# Patient Record
Sex: FEMALE | Race: WHITE | NOT HISPANIC OR LATINO
[De-identification: names, ages, dates, MRNs, and addresses within clinical notes are randomized per-mention and may not be internally consistent; named-entity substitution may affect disease eponyms.]

---

## 2018-04-26 ENCOUNTER — APPOINTMENT (OUTPATIENT)
Dept: SURGERY | Facility: CLINIC | Age: 43
End: 2018-04-26
Payer: COMMERCIAL

## 2018-04-26 VITALS
BODY MASS INDEX: 24.11 KG/M2 | OXYGEN SATURATION: 100 % | TEMPERATURE: 98.5 F | DIASTOLIC BLOOD PRESSURE: 83 MMHG | HEART RATE: 65 BPM | WEIGHT: 150 LBS | HEIGHT: 66 IN | SYSTOLIC BLOOD PRESSURE: 127 MMHG | RESPIRATION RATE: 14 BRPM

## 2018-04-26 DIAGNOSIS — Z82.3 FAMILY HISTORY OF STROKE: ICD-10-CM

## 2018-04-26 DIAGNOSIS — N83.209 UNSPECIFIED OVARIAN CYST, UNSPECIFIED SIDE: ICD-10-CM

## 2018-04-26 DIAGNOSIS — N93.9 ABNORMAL UTERINE AND VAGINAL BLEEDING, UNSPECIFIED: ICD-10-CM

## 2018-04-26 DIAGNOSIS — Z86.79 PERSONAL HISTORY OF OTHER DISEASES OF THE CIRCULATORY SYSTEM: ICD-10-CM

## 2018-04-26 DIAGNOSIS — K63.5 POLYP OF COLON: ICD-10-CM

## 2018-04-26 DIAGNOSIS — Z82.49 FAMILY HISTORY OF ISCHEMIC HEART DISEASE AND OTHER DISEASES OF THE CIRCULATORY SYSTEM: ICD-10-CM

## 2018-04-26 DIAGNOSIS — Z87.2 PERSONAL HISTORY OF DISEASES OF THE SKIN AND SUBCUTANEOUS TISSUE: ICD-10-CM

## 2018-04-26 DIAGNOSIS — Z80.0 FAMILY HISTORY OF MALIGNANT NEOPLASM OF DIGESTIVE ORGANS: ICD-10-CM

## 2018-04-26 DIAGNOSIS — Z83.49 FAMILY HISTORY OF OTHER ENDOCRINE, NUTRITIONAL AND METABOLIC DISEASES: ICD-10-CM

## 2018-04-26 PROCEDURE — 99203 OFFICE O/P NEW LOW 30 MIN: CPT

## 2018-05-09 ENCOUNTER — APPOINTMENT (OUTPATIENT)
Dept: SURGERY | Facility: CLINIC | Age: 43
End: 2018-05-09
Payer: COMMERCIAL

## 2018-05-09 VITALS
SYSTOLIC BLOOD PRESSURE: 111 MMHG | OXYGEN SATURATION: 100 % | RESPIRATION RATE: 15 BRPM | DIASTOLIC BLOOD PRESSURE: 76 MMHG | HEART RATE: 58 BPM | TEMPERATURE: 98.2 F

## 2018-05-09 PROCEDURE — 99213 OFFICE O/P EST LOW 20 MIN: CPT

## 2018-05-09 RX ORDER — CEFUROXIME AXETIL 250 MG/1
250 TABLET ORAL TWICE DAILY
Qty: 20 | Refills: 0 | Status: DISCONTINUED | COMMUNITY
Start: 2018-04-26 | End: 2018-05-09

## 2019-09-24 ENCOUNTER — APPOINTMENT (OUTPATIENT)
Dept: ORTHOPEDIC SURGERY | Facility: CLINIC | Age: 44
End: 2019-09-24
Payer: COMMERCIAL

## 2019-09-24 VITALS
HEIGHT: 66 IN | WEIGHT: 170 LBS | BODY MASS INDEX: 27.32 KG/M2 | SYSTOLIC BLOOD PRESSURE: 108 MMHG | HEART RATE: 96 BPM | DIASTOLIC BLOOD PRESSURE: 74 MMHG

## 2019-09-24 DIAGNOSIS — M23.92 UNSPECIFIED INTERNAL DERANGEMENT OF LEFT KNEE: ICD-10-CM

## 2019-09-24 PROCEDURE — 20610 DRAIN/INJ JOINT/BURSA W/O US: CPT | Mod: LT

## 2019-09-24 PROCEDURE — 73564 X-RAY EXAM KNEE 4 OR MORE: CPT | Mod: LT

## 2019-09-24 PROCEDURE — 99204 OFFICE O/P NEW MOD 45 MIN: CPT | Mod: 25

## 2019-09-24 NOTE — HISTORY OF PRESENT ILLNESS
[de-identified] : Ms. AYANA MÉNDEZ is a 44 year female who presents to office complaining of left knee pain with insidious onset over the last few months.\par Pain is mainly present with positional changes such as when lifting the leg and when bending it when off of her leg. It doesn't hurt with walking or going up and down stairs.\par Pain is a dull/achy pain that is becoming more and more constant.\par Denies radiation of pain or distal neuropathy.\par Relieving factors include nothing specifically.\par Denies buckling, locking, numbness, tingling, etc.\par Patient has not tried any conservative treatments for her pain thus far.\par All review of systems, family history, social history, surgical history, and past medical history not previously stated as positive are negative.

## 2019-09-24 NOTE — PHYSICAL EXAM
[de-identified] : Constitutional - the patient is of normal build and nutrition.  The patient remains oriented to person, time, and  place.  Mood is normal. Vital signs as recorded.  The patients gait is WNL. The patient has satisfactory  balance and can stand on toes and heels.\par \par The patient has no difficulty with respiration. Respiration at rest is a normal rate. The patient is not short of breath and has not become short of breath with short ambulation. There is no audible wheezing. No coughing.\par \par Skin is normal for the patient's age. There are no abnormal masses or lymph nodes which stand out in the lower extremities.\par \par Spine - deep tendon reflexes are symmetric.  She has been seen by Dr. Zechariah Jarvis for some scoliosis.\par \par \par UPPER EXTREMITIES \par \par Shoulders ROM  is symmetric  and the motion is satisfactory.  There is no significant shoulder pain or limitation in motion which would make using a cane or a walker difficult. Shoulder stability and  strength are satisfactory.\par \par Circulation appears satisfactory with pedal pulses present.  There is no major edema in the lower legs. No skin tenderness or increased temperature. No major varicosities.\par \par HIP EXAMINATION the abduction and abduction as well as rotation measurements were taken with the hip in flexion.\par \par Motion\par Hip flexion                               *[Right 135   Left 135]\par Hip abduction                         *[Right 70      Left 70]\par Hip adduction                         *[Right 35     Left 35]\par Hip external  Rotation             *[Right 65     Left 65]\par Hip Internal Rotation              *[Right 20      Left 20]\par Hip Extension                         *[Right 0        Left 0]\par \par The hips have good range of motion. There is good strength across the hips. There is no crepitus in either hip. The alignment of the hips are normal.\par \par \par KNEE EXAMINATION\par \par Motion\par Right Knee    has no pain.  She goes from full extension to 140 degrees of flexion.  She has good medial lateral and anterior posterior stability there is no major crepitus.  There is no Baker's cyst and no effusion.\par \par    \par Left  Knee    goes from 0 to 140 degrees of flexion but she does have pain with full flexion at the lateral portion of her patella and possibly the posterior lateral joint line.  There is a very small effusion in her knee she has good medial lateral and anterior posterior stability she has no Baker's cyst her Steinmann test at this time is negative.\par \par \par Ankle and foot examination\par Of the ankle has normal motion.  There is normal ankle stability.  The patient has no major abnormalities of the foot.\par \par \par \par  [de-identified] : 4 views of the patient's left knee show patella tracking satisfactorily there may be a very small for spur forming or small osteophyte off the peripheral lateral portion of her patella.  Her alignment is normal and there are no bony abnormalities no major arthritis can be seen by x-ray.  This does not rule out a meniscal tear.

## 2019-09-24 NOTE — PROCEDURE
[de-identified] : Procedure Note:\par \par Anatomic Location:  Left Knee\par \par Diagnosis:  Arthritis\par \par Procedure:  Injection of 2cc  of Marcaine 0.25% plain and Celestone 1cc, 6mg\par \par Local Spray: Ethyl Chloride.\par \par \par Patient has consented for the procedure.\par \par Injection  through a lateral parapatella approach.\par \par Patient tolerated the procedure well.\par \par Patient instructed to call the office if any reaction, fever, chills, increased erythema or swelling.   [phone].

## 2019-09-24 NOTE — DISCUSSION/SUMMARY
[de-identified] : This patient has slight swelling in her left knee and is having either patellofemoral symptoms and pain with the lateral portion of her patella and lateral femoral condyle or could have a small meniscal tear but I tend to doubt that diagnosis.  She is being sent for an MRI she tolerated a local injection well.

## 2019-09-26 ENCOUNTER — OTHER (OUTPATIENT)
Age: 44
End: 2019-09-26

## 2019-10-20 ENCOUNTER — FORM ENCOUNTER (OUTPATIENT)
Age: 44
End: 2019-10-20

## 2019-10-21 ENCOUNTER — OUTPATIENT (OUTPATIENT)
Dept: OUTPATIENT SERVICES | Facility: HOSPITAL | Age: 44
LOS: 1 days | End: 2019-10-21
Payer: COMMERCIAL

## 2019-10-21 ENCOUNTER — APPOINTMENT (OUTPATIENT)
Dept: MRI IMAGING | Facility: CLINIC | Age: 44
End: 2019-10-21
Payer: COMMERCIAL

## 2019-10-21 DIAGNOSIS — Z00.00 ENCOUNTER FOR GENERAL ADULT MEDICAL EXAMINATION WITHOUT ABNORMAL FINDINGS: ICD-10-CM

## 2019-10-21 PROCEDURE — 73721 MRI JNT OF LWR EXTRE W/O DYE: CPT | Mod: 26,LT

## 2019-10-21 PROCEDURE — 73721 MRI JNT OF LWR EXTRE W/O DYE: CPT

## 2019-10-28 ENCOUNTER — APPOINTMENT (OUTPATIENT)
Dept: ORTHOPEDIC SURGERY | Facility: CLINIC | Age: 44
End: 2019-10-28
Payer: COMMERCIAL

## 2019-10-28 DIAGNOSIS — M25.562 PAIN IN LEFT KNEE: ICD-10-CM

## 2019-10-28 PROCEDURE — 99203 OFFICE O/P NEW LOW 30 MIN: CPT

## 2019-10-30 PROBLEM — M25.562 LEFT KNEE PAIN: Status: ACTIVE | Noted: 2019-10-30

## 2019-10-30 NOTE — HISTORY OF PRESENT ILLNESS
[de-identified] : 44 year old female presents today with left knee pain since summer 2019. No injury reported. She was evaluated by Dr. Castillo, cortisone injection was administered and MRI was obtained. Cortisone injection gave her relief for 5 days. MRI obtained and referred for evaluation. The pain is constant worse with flexion, lifting her leg up, and crossing her legs. She describes the pain as achy. She also previously saw a rheumatologist for her hands and knees. Denies buckling, locking, catching, numbness or tingling. \par \par The patient's past medical history, past surgical history, medications and allergies were reviewed by me today with the patient and documented accordingly. In addition, the patient's family and social history, which were noncontributory to this visit, were reviewed also.

## 2019-10-30 NOTE — DISCUSSION/SUMMARY
[de-identified] : 44 year old female presents with left knee patellofemoral chondromalacia.\par \par Presentation is consistent with early degenerative change and arthrosis to the knee. MRI left knee dated 10/21/19 shows incidental finding of a discoid meniscus that does not appear to be the root cause of her pain. Described her discomfort is likely due to overuse, and age-related "wear and tear". Pain may be related to breakdown of the chondral surfaces, cartilage, or ligaments of the knee. \par \par Recommendation: Conservative care & observation; including rest/activity avoidance until less symptomatic with subsequent gradual return to full low impact activity as tolerated. Patient may also use OTC NSAID's or acetaminophen as tolerated, with application of ice/heat to the area 2-3x daily for 20 minutes after periods of activity. \par \par Begin a trial of PT. Rx given. \par \par Followup as needed if pain persists for further evaluation and treatment.\par

## 2019-10-30 NOTE — PHYSICAL EXAM
[de-identified] : Oriented to time, place, person\par Mood: Normal\par Affect: Normal\par Appearance: Healthy, well appearing, no acute distress.\par Gait: Normal\par Assistive Devices: None\par \par Left knee exam\par \par Skin: Clean, dry, intact\par Inspection: No obvious malalignment, no masses, no swelling, no effusion\par Pulses: 2+ DP/PT pulses\par ROM: 0-135 degrees of flexion. + pain with deep knee flexion/extension.\par Tenderness: No MJLT. No LJLT. No pain over the patella facets. No pain to the quadriceps tendon. No pain to the patella tendon. No posterior knee tenderness. + peripatellar ttp\par Stability: Stable to varus, valgus. Negative lachman testing. Negative anterior drawer, negative posterior drawer.\par Strength: 5/5 Q/H/TA/GS/EHL, without atrophy\par Neuro: In tact to light touch throughout, DTR's normal\par Additional tests: Negative McMurrays test, Negative patellar grind test.  [de-identified] : \par 4 views of the left knee were obtained 9/24/2019 that show no acute fracture or dislocation. There is no medial, no lateral and no patellofemoral degenerative change seen. There is no significant malalignment. No significant other obvious osseous abnormality, otherwise unremarkable.\par \par MRI left knee dated 10/21/19 shows discoid meniscus and early degeneration of medial meniscus without tear.\par

## 2020-01-12 ENCOUNTER — TRANSCRIPTION ENCOUNTER (OUTPATIENT)
Age: 45
End: 2020-01-12

## 2021-02-10 ENCOUNTER — APPOINTMENT (OUTPATIENT)
Dept: SURGERY | Facility: CLINIC | Age: 46
End: 2021-02-10
Payer: COMMERCIAL

## 2021-02-10 VITALS
DIASTOLIC BLOOD PRESSURE: 77 MMHG | RESPIRATION RATE: 15 BRPM | OXYGEN SATURATION: 97 % | HEART RATE: 90 BPM | SYSTOLIC BLOOD PRESSURE: 121 MMHG | TEMPERATURE: 97.7 F

## 2021-02-10 DIAGNOSIS — F41.8 OTHER SPECIFIED ANXIETY DISORDERS: ICD-10-CM

## 2021-02-10 PROCEDURE — 10081 I&D PILONIDAL CYST COMP: CPT

## 2021-02-10 PROCEDURE — 99072 ADDL SUPL MATRL&STAF TM PHE: CPT

## 2021-02-10 PROCEDURE — 99214 OFFICE O/P EST MOD 30 MIN: CPT | Mod: 25

## 2021-02-10 RX ORDER — CLONAZEPAM 0.5 MG/1
0.5 TABLET ORAL
Refills: 0 | Status: ACTIVE | COMMUNITY

## 2021-02-10 RX ORDER — ALPRAZOLAM 2 MG/1
TABLET ORAL
Refills: 0 | Status: ACTIVE | COMMUNITY

## 2021-02-10 RX ORDER — ESCITALOPRAM OXALATE 20 MG/1
20 TABLET, FILM COATED ORAL
Refills: 0 | Status: ACTIVE | COMMUNITY

## 2021-02-10 NOTE — HISTORY OF PRESENT ILLNESS
[FreeTextEntry1] : Chayito is a 46 y/o female here for follow up visit. Patient had surgery for pilonidal disease at age 19. She was seen on 4/26/18 with pilonidal cyst enlargement. No erythema or tenderness. Started on Cefuroxime. Last seen on 5/9/18, patient asymptomatic. No surgery indicated at that time. \par Today reports pain started a week ago and started to drain on Saturday. Reports slight constipation but has daily bm - no pain or bleeding.

## 2021-02-10 NOTE — ASSESSMENT
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  I recommended incision and drainage.  Patient agreed.  1% lidocaine with half percent Marcaine plus epinephrine injected.  Incision and drainage performed with excision of 1 cm diameter [place] of skin and disruption of loculations.  Sitz bath's.  Cefuroxime because of surrounding cellulitis.  Follow-up 3 weeks.  Since patient has had recurrence with a complex abscess, elective excision should be considered.  This was discussed with the patient and all questions were answered

## 2021-02-17 LAB — BACTERIA WND CULT: ABNORMAL

## 2021-03-03 ENCOUNTER — APPOINTMENT (OUTPATIENT)
Dept: SURGERY | Facility: CLINIC | Age: 46
End: 2021-03-03
Payer: COMMERCIAL

## 2021-03-03 VITALS
BODY MASS INDEX: 30.53 KG/M2 | SYSTOLIC BLOOD PRESSURE: 119 MMHG | HEART RATE: 71 BPM | HEIGHT: 66 IN | WEIGHT: 190 LBS | DIASTOLIC BLOOD PRESSURE: 82 MMHG | OXYGEN SATURATION: 96 % | TEMPERATURE: 97 F | RESPIRATION RATE: 16 BRPM

## 2021-03-03 DIAGNOSIS — L05.01 PILONIDAL CYST WITH ABSCESS: ICD-10-CM

## 2021-03-03 PROCEDURE — 99213 OFFICE O/P EST LOW 20 MIN: CPT

## 2021-03-03 PROCEDURE — 99072 ADDL SUPL MATRL&STAF TM PHE: CPT

## 2021-03-03 RX ORDER — CEFUROXIME AXETIL 500 MG/1
500 TABLET ORAL
Qty: 10 | Refills: 0 | Status: DISCONTINUED | COMMUNITY
Start: 2021-02-10 | End: 2021-03-03

## 2021-03-03 NOTE — HISTORY OF PRESENT ILLNESS
[FreeTextEntry1] : Chayito is a 44 y/o female here for follow up visit. Patient had surgery for pilonidal disease at age 19. She was seen on 4/26/18 with pilonidal cyst enlargement. No erythema or tenderness. Started on Cefuroxime. Last seen on 2/10/21 with recurrent pilonidal abscess after prior surgery. Incision and drainage was performed. Patient was placed on Cefuroxime because of surrounding cellulitis. Since patient has had recurrence with a complex abscess, elective excision should be considered. \par The patient had some drainage for about 2 weeks since I&D which has now subsided. Denies pain. Just some discomfort when applying direct pressure. Abx completed. Denies swelling.

## 2021-03-03 NOTE — ASSESSMENT
[FreeTextEntry1] : Recurrent pilonidal abscess.  Indications, risk, benefits, alternatives for excision with Limberg flap including but not limited to bleeding, infection, recurrence, and prolonged wound healing reviewed.  All questions answered.

## 2021-03-24 ENCOUNTER — OUTPATIENT (OUTPATIENT)
Dept: OUTPATIENT SERVICES | Facility: HOSPITAL | Age: 46
LOS: 1 days | End: 2021-03-24
Payer: COMMERCIAL

## 2021-03-24 VITALS
TEMPERATURE: 98 F | SYSTOLIC BLOOD PRESSURE: 115 MMHG | RESPIRATION RATE: 14 BRPM | DIASTOLIC BLOOD PRESSURE: 83 MMHG | HEIGHT: 66 IN | OXYGEN SATURATION: 97 % | WEIGHT: 192.02 LBS | HEART RATE: 70 BPM

## 2021-03-24 DIAGNOSIS — Z98.890 OTHER SPECIFIED POSTPROCEDURAL STATES: Chronic | ICD-10-CM

## 2021-03-24 DIAGNOSIS — Z01.818 ENCOUNTER FOR OTHER PREPROCEDURAL EXAMINATION: ICD-10-CM

## 2021-03-24 DIAGNOSIS — L05.01 PILONIDAL CYST WITH ABSCESS: ICD-10-CM

## 2021-03-24 DIAGNOSIS — Z97.5 PRESENCE OF (INTRAUTERINE) CONTRACEPTIVE DEVICE: Chronic | ICD-10-CM

## 2021-03-24 DIAGNOSIS — N83.209 UNSPECIFIED OVARIAN CYST, UNSPECIFIED SIDE: Chronic | ICD-10-CM

## 2021-03-24 LAB
ANION GAP SERPL CALC-SCNC: 12 MMOL/L — SIGNIFICANT CHANGE UP (ref 5–17)
BUN SERPL-MCNC: 13 MG/DL — SIGNIFICANT CHANGE UP (ref 7–23)
CALCIUM SERPL-MCNC: 9.6 MG/DL — SIGNIFICANT CHANGE UP (ref 8.4–10.5)
CHLORIDE SERPL-SCNC: 103 MMOL/L — SIGNIFICANT CHANGE UP (ref 96–108)
CO2 SERPL-SCNC: 23 MMOL/L — SIGNIFICANT CHANGE UP (ref 22–31)
CREAT SERPL-MCNC: 0.7 MG/DL — SIGNIFICANT CHANGE UP (ref 0.5–1.3)
GLUCOSE SERPL-MCNC: 86 MG/DL — SIGNIFICANT CHANGE UP (ref 70–99)
HCT VFR BLD CALC: 42.9 % — SIGNIFICANT CHANGE UP (ref 34.5–45)
HGB BLD-MCNC: 14.2 G/DL — SIGNIFICANT CHANGE UP (ref 11.5–15.5)
MCHC RBC-ENTMCNC: 29.6 PG — SIGNIFICANT CHANGE UP (ref 27–34)
MCHC RBC-ENTMCNC: 33.1 GM/DL — SIGNIFICANT CHANGE UP (ref 32–36)
MCV RBC AUTO: 89.4 FL — SIGNIFICANT CHANGE UP (ref 80–100)
NRBC # BLD: 0 /100 WBCS — SIGNIFICANT CHANGE UP (ref 0–0)
PLATELET # BLD AUTO: 307 K/UL — SIGNIFICANT CHANGE UP (ref 150–400)
POTASSIUM SERPL-MCNC: 4.5 MMOL/L — SIGNIFICANT CHANGE UP (ref 3.5–5.3)
POTASSIUM SERPL-SCNC: 4.5 MMOL/L — SIGNIFICANT CHANGE UP (ref 3.5–5.3)
RBC # BLD: 4.8 M/UL — SIGNIFICANT CHANGE UP (ref 3.8–5.2)
RBC # FLD: 12.3 % — SIGNIFICANT CHANGE UP (ref 10.3–14.5)
SODIUM SERPL-SCNC: 138 MMOL/L — SIGNIFICANT CHANGE UP (ref 135–145)
WBC # BLD: 6.49 K/UL — SIGNIFICANT CHANGE UP (ref 3.8–10.5)
WBC # FLD AUTO: 6.49 K/UL — SIGNIFICANT CHANGE UP (ref 3.8–10.5)

## 2021-03-24 PROCEDURE — 85027 COMPLETE CBC AUTOMATED: CPT

## 2021-03-24 PROCEDURE — 80048 BASIC METABOLIC PNL TOTAL CA: CPT

## 2021-03-24 PROCEDURE — G0463: CPT

## 2021-03-24 RX ORDER — SODIUM CHLORIDE 9 MG/ML
3 INJECTION INTRAMUSCULAR; INTRAVENOUS; SUBCUTANEOUS EVERY 8 HOURS
Refills: 0 | Status: DISCONTINUED | OUTPATIENT
Start: 2021-04-05 | End: 2021-04-20

## 2021-03-24 RX ORDER — LIDOCAINE HCL 20 MG/ML
0.2 VIAL (ML) INJECTION ONCE
Refills: 0 | Status: DISCONTINUED | OUTPATIENT
Start: 2021-04-05 | End: 2021-04-20

## 2021-03-24 NOTE — H&P PST ADULT - HEALTH CARE MAINTENANCE
has not received flu vaccine this season has received flu vaccine this season  received 1 dose Pfizer, will received 2nd dose on 3/26

## 2021-03-24 NOTE — H&P PST ADULT - ACTIVITY
housework, shopping, able to walk up 2 flights of stairs take walks, housework, shopping, able to walk up 2 flights of stairs take walks, housework, shopping, able to walk up 2-3 flights of stairs

## 2021-03-24 NOTE — H&P PST ADULT - HISTORY OF PRESENT ILLNESS
44 y/o female here for follow up visit. Patient had surgery for pilonidal disease at age 19. She was seen on 4/26/18 with pilonidal cyst enlargement. No erythema or tenderness. Started on Cefuroxime. Last seen on 2/10/21 with recurrent pilonidal abscess after prior surgery. Incision and drainage was performed. Patient was placed on Cefuroxime because of surrounding cellulitis. Since patient has had recurrence with a complex abscess, elective excision should be considered.   The patient had some drainage for about 2 weeks since I&D which has now subsided. Denies pain. Just some discomfort when applying direct pressure. Abx completed. Denies swelling.      46 y/o female. [facility] anxiety here for follow up visit. Patient had surgery for pilonidal disease at age 19. She was seen on 4/26/18 with pilonidal cyst enlargement. No erythema or tenderness. Started on Cefuroxime. Last seen on 2/10/21 with recurrent pilonidal abscess after prior surgery. Incision and drainage was performed. Patient was placed on Cefuroxime because of surrounding cellulitis. Since patient has had recurrence with a complex abscess, elective excision should be considered.   The patient had some drainage for about 2 weeks since I&D which has now subsided. Denies pain. Just some discomfort when applying direct pressure. Abx completed. Denies swelling.      46 y/o female. PMH anxiety, pilonidal cyst (s/p surgical excision at age 19), recently had recurrence of pilonidal cyst with surrounding cellulitis, treated wth cefuroxime, s/p I&D.  evaluated by Dr. Cox, now presents to [facility] scheduled for excision of pilonidal cyst with Limberg flap closure on 4/6.  pt denies cough, fever, SOB, recent travel or exposure to confirmed covid cases. covid test scheduled on 4/4 at [facility].  **PMH pericarditis in 2003, evaluated by pulmonologist and possibly cardiologist, no surgical intervention required, pericarditis eventually resolved by itself, pt METS=8, denies SOB, palpitations, chest pain, d/w anesthesiologist, Dr. Hollins, no intervention required. see Teams chat 3/24/3021 around 12noon.** 44 y/o female. PMH anxiety, pilonidal cyst (s/p surgical excision at age 19), recently had recurrence of pilonidal cyst with surrounding cellulitis, treated wth cefuroxime, s/p I&D.  evaluated by Dr. Cox, now presents to [facility] scheduled for excision of pilonidal cyst with Limberg flap closure on 4/6. Recurrent abscesses  pt denies cough, fever, SOB, recent travel or exposure to confirmed covid cases. covid test scheduled on 4/4 at [facility].  **PMH pericarditis in 2003, evaluated by pulmonologist and possibly cardiologist, no surgical intervention required, pericarditis eventually resolved by itself, pt METS=8, denies SOB, palpitations, chest pain, d/w anesthesiologist, Dr. Hollins, no intervention required. see Teams chat 3/24/3021 around 12noon.**

## 2021-03-24 NOTE — H&P PST ADULT - NSANTHOSAYNRD_GEN_A_CORE
No. GAUTAM screening performed.  STOP BANG Legend: 0-2 = LOW Risk; 3-4 = INTERMEDIATE Risk; 5-8 = HIGH Risk

## 2021-03-24 NOTE — H&P PST ADULT - NSICDXPASTMEDICALHX_GEN_ALL_CORE_FT
PAST MEDICAL HISTORY:  Anxiety     History of excision of pilonidal cyst during teenage years     PAST MEDICAL HISTORY:  Anxiety     History of excision of pilonidal cyst age 19    Ruptured ovarian cyst PMH, resolved     PAST MEDICAL HISTORY:  Anxiety     H/O pericarditis 2003  evaluated by pulmonologist at the time, no treatment required, eventually resolved without intervention    History of excision of pilonidal cyst age 19    Ruptured ovarian cyst PMH, resolved

## 2021-03-24 NOTE — H&P PST ADULT - LAST ECHOCARDIOGRAM
unsure, "maybe I had one when had pericarditis in 2003, I was evaluated by a pulmonologist and maybe a cardiologist.  I never had any intervention, never tapped.  eventually the carditis resolved without intervention."

## 2021-03-24 NOTE — H&P PST ADULT - NSICDXPROBLEM_GEN_ALL_CORE_FT
PROBLEM DIAGNOSES  Problem: Pilonidal cyst with abscess  Assessment and Plan: excision of pilonidal cyst with Limberg flap closure

## 2021-03-30 PROBLEM — Z98.890 OTHER SPECIFIED POSTPROCEDURAL STATES: Chronic | Status: ACTIVE | Noted: 2021-03-24

## 2021-03-30 PROBLEM — N83.209 UNSPECIFIED OVARIAN CYST, UNSPECIFIED SIDE: Chronic | Status: ACTIVE | Noted: 2021-03-24

## 2021-03-30 PROBLEM — Z86.79 PERSONAL HISTORY OF OTHER DISEASES OF THE CIRCULATORY SYSTEM: Chronic | Status: ACTIVE | Noted: 2021-03-24

## 2021-03-30 PROBLEM — F41.9 ANXIETY DISORDER, UNSPECIFIED: Chronic | Status: ACTIVE | Noted: 2021-03-24

## 2021-04-04 ENCOUNTER — OUTPATIENT (OUTPATIENT)
Dept: OUTPATIENT SERVICES | Facility: HOSPITAL | Age: 46
LOS: 1 days | End: 2021-04-04
Payer: COMMERCIAL

## 2021-04-04 ENCOUNTER — TRANSCRIPTION ENCOUNTER (OUTPATIENT)
Age: 46
End: 2021-04-04

## 2021-04-04 DIAGNOSIS — Z97.5 PRESENCE OF (INTRAUTERINE) CONTRACEPTIVE DEVICE: Chronic | ICD-10-CM

## 2021-04-04 DIAGNOSIS — Z98.890 OTHER SPECIFIED POSTPROCEDURAL STATES: Chronic | ICD-10-CM

## 2021-04-04 DIAGNOSIS — N83.209 UNSPECIFIED OVARIAN CYST, UNSPECIFIED SIDE: Chronic | ICD-10-CM

## 2021-04-04 DIAGNOSIS — Z11.52 ENCOUNTER FOR SCREENING FOR COVID-19: ICD-10-CM

## 2021-04-04 LAB — SARS-COV-2 RNA SPEC QL NAA+PROBE: SIGNIFICANT CHANGE UP

## 2021-04-04 PROCEDURE — C9803: CPT

## 2021-04-04 PROCEDURE — U0005: CPT

## 2021-04-04 PROCEDURE — U0003: CPT

## 2021-04-05 ENCOUNTER — RESULT REVIEW (OUTPATIENT)
Age: 46
End: 2021-04-05

## 2021-04-05 ENCOUNTER — OUTPATIENT (OUTPATIENT)
Dept: OUTPATIENT SERVICES | Facility: HOSPITAL | Age: 46
LOS: 1 days | End: 2021-04-05
Payer: COMMERCIAL

## 2021-04-05 ENCOUNTER — APPOINTMENT (OUTPATIENT)
Dept: SURGERY | Facility: HOSPITAL | Age: 46
End: 2021-04-05
Payer: COMMERCIAL

## 2021-04-05 VITALS
RESPIRATION RATE: 15 BRPM | SYSTOLIC BLOOD PRESSURE: 119 MMHG | TEMPERATURE: 97 F | HEART RATE: 74 BPM | OXYGEN SATURATION: 96 % | HEIGHT: 66 IN | WEIGHT: 192.02 LBS | DIASTOLIC BLOOD PRESSURE: 81 MMHG

## 2021-04-05 VITALS
HEART RATE: 57 BPM | OXYGEN SATURATION: 98 % | DIASTOLIC BLOOD PRESSURE: 66 MMHG | RESPIRATION RATE: 16 BRPM | TEMPERATURE: 97 F | SYSTOLIC BLOOD PRESSURE: 121 MMHG

## 2021-04-05 DIAGNOSIS — L05.01 PILONIDAL CYST WITH ABSCESS: ICD-10-CM

## 2021-04-05 DIAGNOSIS — Z98.890 OTHER SPECIFIED POSTPROCEDURAL STATES: Chronic | ICD-10-CM

## 2021-04-05 DIAGNOSIS — Z97.5 PRESENCE OF (INTRAUTERINE) CONTRACEPTIVE DEVICE: Chronic | ICD-10-CM

## 2021-04-05 DIAGNOSIS — N83.209 UNSPECIFIED OVARIAN CYST, UNSPECIFIED SIDE: Chronic | ICD-10-CM

## 2021-04-05 PROCEDURE — 88304 TISSUE EXAM BY PATHOLOGIST: CPT

## 2021-04-05 PROCEDURE — 15734 MUSCLE-SKIN GRAFT TRUNK: CPT

## 2021-04-05 PROCEDURE — 88304 TISSUE EXAM BY PATHOLOGIST: CPT | Mod: 26

## 2021-04-05 PROCEDURE — 11772 EXC PILONIDAL CYST COMP: CPT

## 2021-04-05 RX ORDER — ACETAMINOPHEN 500 MG
3 TABLET ORAL
Qty: 0 | Refills: 0 | DISCHARGE

## 2021-04-05 RX ORDER — OXYCODONE HYDROCHLORIDE 5 MG/1
1 TABLET ORAL
Qty: 10 | Refills: 0
Start: 2021-04-05 | End: 2021-04-05

## 2021-04-05 RX ORDER — CLONAZEPAM 1 MG
1 TABLET ORAL
Qty: 0 | Refills: 0 | DISCHARGE

## 2021-04-05 RX ORDER — OXYCODONE HYDROCHLORIDE 5 MG/1
5 TABLET ORAL ONCE
Refills: 0 | Status: DISCONTINUED | OUTPATIENT
Start: 2021-04-05 | End: 2021-04-05

## 2021-04-05 RX ORDER — IBUPROFEN 200 MG
3 TABLET ORAL
Qty: 0 | Refills: 0 | DISCHARGE

## 2021-04-05 RX ORDER — ALPRAZOLAM 0.25 MG
1 TABLET ORAL
Qty: 0 | Refills: 0 | DISCHARGE

## 2021-04-05 RX ORDER — ONDANSETRON 8 MG/1
4 TABLET, FILM COATED ORAL ONCE
Refills: 0 | Status: DISCONTINUED | OUTPATIENT
Start: 2021-04-05 | End: 2021-04-20

## 2021-04-05 RX ORDER — ESCITALOPRAM OXALATE 10 MG/1
1 TABLET, FILM COATED ORAL
Qty: 0 | Refills: 0 | DISCHARGE

## 2021-04-05 RX ORDER — SODIUM CHLORIDE 9 MG/ML
1000 INJECTION, SOLUTION INTRAVENOUS
Refills: 0 | Status: DISCONTINUED | OUTPATIENT
Start: 2021-04-05 | End: 2021-04-20

## 2021-04-05 NOTE — ASU DISCHARGE PLAN (ADULT/PEDIATRIC) - NURSING INSTRUCTIONS
You were given Tylenol during your visit, the next dose of Tylenol will be on or after ______9:15 PM_____ ,today/tonight and every 6 hours afterwards for pain management, do not take any Tylenol containing products until this time. Do not exceed more than 4000mg of Tylenol in one 24 hour setting. If no contraindications, you may alternate with Ibuprofen or Naproxen 3 hours after dose of Tylenol. Ibuprofen can be taken every 6 hours. Naproxen may be taken every 12 hours.

## 2021-04-05 NOTE — ASU DISCHARGE PLAN (ADULT/PEDIATRIC) - CARE PROVIDER_API CALL
Rachid Cox)  ColonRectal Surgery; Surgery  [address], [address]  [place] [address]  Phone: [phone]  Fax: [phone]  Follow Up Time:

## 2021-04-05 NOTE — BRIEF OPERATIVE NOTE - NSICDXBRIEFPROCEDURE_GEN_ALL_CORE_FT
PROCEDURES:  Excision, pilonidal cyst, with closure using Z-plasty technique 05-Apr-2021 16:58:32  Avinash Escobar

## 2021-04-05 NOTE — ASU PATIENT PROFILE, ADULT - PSH
History of excision of pilonidal cyst  age 19  Presence of IUD  inserted secondary to menorrhagia  Ruptured ovarian cyst    S/P D&C (status post dilation and curettage)  secondary to menorrhagia x2  2005  2011  S/P LASIK surgery

## 2021-04-05 NOTE — ASU PATIENT PROFILE, ADULT - PMH
Anxiety    H/O pericarditis  2003  evaluated by pulmonologist at the time, no treatment required, eventually resolved without intervention  History of excision of pilonidal cyst  age 19  Ruptured ovarian cyst  PMH, resolved

## 2021-04-05 NOTE — ASU DISCHARGE PLAN (ADULT/PEDIATRIC) - ASU DC SPECIAL INSTRUCTIONSFT
DIET: You may resume your regular diet.  BATHING: Front shower only for the next 3 days.   ACTIVITY: No heavy lifting or straining for 2 weeks. Otherwise, you may return to your usual level of physical activity. If you are taking narcotic pain medication (such as Oxycodone) DO NOT drive or make important decisions.  NOTIFY YOUR SURGEON IF: You have any bleeding that does not stop, any pus draining from your wound(s), any fever (over 100.4) or chills, persistent nausea/vomiting, persistent diarrhea, or if your pain is not controlled on your discharge pain medications.  WOUND CARE: Keep outer clear dressing and gauze in place.  Please keep incisions clean and dry. Please do not scrub or rub incisions. Do not use lotion or powder on incisions.  DRAIN: You will be discharged with a drain present. Please empty and record your drain outputs daily and keep track of amounts in a log. Please bring your log with you to your follow-up appointments. They will aid in deciding when to remove the drains.

## 2021-04-05 NOTE — ASU DISCHARGE PLAN (ADULT/PEDIATRIC) - ACTIVITY LEVEL
Until cleared by Dr. Cox/No excercise/No heavy lifting/No sports/gym/No weight bearing/Nothing per rectum/No tub baths

## 2021-04-08 ENCOUNTER — APPOINTMENT (OUTPATIENT)
Dept: SURGERY | Facility: CLINIC | Age: 46
End: 2021-04-08
Payer: COMMERCIAL

## 2021-04-08 VITALS — DIASTOLIC BLOOD PRESSURE: 74 MMHG | HEART RATE: 74 BPM | SYSTOLIC BLOOD PRESSURE: 109 MMHG | OXYGEN SATURATION: 98 %

## 2021-04-08 VITALS — RESPIRATION RATE: 17 BRPM | TEMPERATURE: 96.7 F

## 2021-04-08 LAB — SURGICAL PATHOLOGY STUDY: SIGNIFICANT CHANGE UP

## 2021-04-08 PROCEDURE — 99024 POSTOP FOLLOW-UP VISIT: CPT

## 2021-04-08 RX ORDER — ESCITALOPRAM OXALATE 5 MG/1
5 TABLET ORAL
Qty: 30 | Refills: 0 | Status: DISCONTINUED | COMMUNITY
Start: 2020-11-06 | End: 2021-04-08

## 2021-04-08 RX ORDER — ESCITALOPRAM OXALATE 10 MG/1
10 TABLET ORAL
Qty: 30 | Refills: 0 | Status: DISCONTINUED | COMMUNITY
Start: 2020-12-11 | End: 2021-04-08

## 2021-04-08 RX ORDER — ALPRAZOLAM 0.25 MG/1
0.25 TABLET ORAL
Qty: 90 | Refills: 0 | Status: DISCONTINUED | COMMUNITY
Start: 2021-01-08 | End: 2021-04-08

## 2021-04-08 NOTE — PHYSICAL EXAM
[de-identified] : non-tender reducible umbilical hernia [FreeTextEntry1] : Normal wound healing.  Steri-Strips in place.  Drain removed.

## 2021-04-08 NOTE — HISTORY OF PRESENT ILLNESS
[FreeTextEntry1] : The patient is a 45 year old female s/p Pilonidal Cystectomy with Limberg Flap on 4/5/21. Patient presents to the office today for drain removal. Patient with persistent pain. Taking Advil/Tylenol OTC. Not taking narcotic. Output 10 cc/daily x 2 days. Patient with persistent nausea. Denies vomiting. Tolerating PO. Has not had a BM since surgery. She is passing gas, feels bloated. Abdominal cramping began yesterday. Pt with known umbilical hernia, was asymptomatic in the past. Now sharp intermittent pain at umbilicus.

## 2021-04-08 NOTE — ASSESSMENT
[FreeTextEntry1] : Normal wound healing.  Dressing changed.  Shower starting tomorrow.  Follow-up 3 weeks

## 2021-04-28 ENCOUNTER — APPOINTMENT (OUTPATIENT)
Dept: SURGERY | Facility: CLINIC | Age: 46
End: 2021-04-28
Payer: COMMERCIAL

## 2021-04-28 VITALS
RESPIRATION RATE: 16 BRPM | OXYGEN SATURATION: 97 % | HEART RATE: 79 BPM | SYSTOLIC BLOOD PRESSURE: 105 MMHG | DIASTOLIC BLOOD PRESSURE: 75 MMHG | TEMPERATURE: 97.3 F

## 2021-04-28 PROCEDURE — 99024 POSTOP FOLLOW-UP VISIT: CPT

## 2021-04-28 NOTE — HISTORY OF PRESENT ILLNESS
[FreeTextEntry1] : Chayito is a 46 y/o female s/p Pilonidal Cystectomy with Limberg Flap on 4/5/21. She was last seen on 4/8/21- Steri-Strips in place. Drain removed. \par Today reports still has pain in area, no drainage noted. Has one normal formed bm daily - no pain or bleeding.\par

## 2021-05-27 ENCOUNTER — APPOINTMENT (OUTPATIENT)
Dept: SURGERY | Facility: CLINIC | Age: 46
End: 2021-05-27
Payer: COMMERCIAL

## 2021-05-27 VITALS
OXYGEN SATURATION: 97 % | TEMPERATURE: 97.8 F | DIASTOLIC BLOOD PRESSURE: 72 MMHG | SYSTOLIC BLOOD PRESSURE: 102 MMHG | HEART RATE: 75 BPM | RESPIRATION RATE: 15 BRPM

## 2021-05-27 DIAGNOSIS — Z09 ENCOUNTER FOR FOLLOW-UP EXAMINATION AFTER COMPLETED TREATMENT FOR CONDITIONS OTHER THAN MALIGNANT NEOPLASM: ICD-10-CM

## 2021-05-27 PROCEDURE — 99024 POSTOP FOLLOW-UP VISIT: CPT

## 2021-05-27 NOTE — ASSESSMENT
[FreeTextEntry1] : Patient with gluteal muscle soreness which is slowly improving.  No evidence of wound infection.  Wound healing normally.  Expect continued slow improvement.  Follow-up 2 months.

## 2021-05-27 NOTE — HISTORY OF PRESENT ILLNESS
[FreeTextEntry1] : Chayito is a 44 y/o female s/p Pilonidal Cystectomy with Limberg Flap on 4/5/21. She was last seen on 4/28, healing well.\par Today reports still uncomfortable with bending or sitting. Moving bowels daily,no pain or bleeding.\par

## 2021-07-28 ENCOUNTER — APPOINTMENT (OUTPATIENT)
Dept: SURGERY | Facility: CLINIC | Age: 46
End: 2021-07-28
Payer: COMMERCIAL

## 2021-08-05 ENCOUNTER — APPOINTMENT (OUTPATIENT)
Dept: SURGERY | Facility: CLINIC | Age: 46
End: 2021-08-05
Payer: COMMERCIAL

## 2021-08-05 VITALS
HEART RATE: 90 BPM | DIASTOLIC BLOOD PRESSURE: 84 MMHG | RESPIRATION RATE: 16 BRPM | SYSTOLIC BLOOD PRESSURE: 120 MMHG | OXYGEN SATURATION: 97 % | TEMPERATURE: 97.6 F

## 2021-08-05 VITALS — RESPIRATION RATE: 16 BRPM | OXYGEN SATURATION: 97 % | HEART RATE: 90 BPM | TEMPERATURE: 97.6 F

## 2021-08-05 DIAGNOSIS — L05.91 PILONIDAL CYST W/OUT ABSCESS: ICD-10-CM

## 2021-08-05 PROCEDURE — 99213 OFFICE O/P EST LOW 20 MIN: CPT

## 2021-08-05 RX ORDER — METFORMIN HYDROCHLORIDE 625 MG/1
TABLET ORAL
Refills: 0 | Status: ACTIVE | COMMUNITY

## 2021-08-05 RX ORDER — BUPROPION HYDROCHLORIDE 100 MG/1
TABLET, FILM COATED ORAL
Refills: 0 | Status: ACTIVE | COMMUNITY

## 2021-08-05 NOTE — HISTORY OF PRESENT ILLNESS
[FreeTextEntry1] : Chayito is a 4 year old female here for 2 month follow up. \par \par s/p Pilonidal Cystectomy with Limberg Flap on 4/5/21\par \par Last colonoscopy 11/5/20-  normal mucosa in the rectum, sigmoid colon, descending colon, transverse colon, ascending colon and cecum. \par Last seen 5/27/21- Patient with gluteal muscle soreness which is slowly improving. No evidence of wound infection. Wound healing normally. Expect continued slow improvement. Follow-up 2 months.\par \par Today pt reports feeling well, no pain. Pt reports occasional sensitivity at surgical site. Pt reports formed BMs daily/ every other day, no pain, no rectal bleeding. Pt reports small appetite, occasional nausea, no vomiting. Not on anticoagulants. \par

## 2021-08-05 NOTE — ASSESSMENT
[FreeTextEntry1] : Pain at harvest site resolved.  Wound healed.  Mild paresthesias.  These should continue to improve.  Patient will return as needed

## 2023-05-08 NOTE — ASU PATIENT PROFILE, ADULT - TEACHING/LEARNING DEVELOPMENTAL CONSIDERATIONS
[facility] Instructions  ([phone])     -Daily weights first thing in the morning after morning void and prior to any oral intake     -Call the clinic with any weight gain greater than 3 pounds over night or 5 pounds in 1 week     -Follow 2000 mg sodium restriction, look at labels and pay attention to serving size    -Elevate your legs when sitting for prolonged periods of time      
none

## 2023-06-12 NOTE — ASU PREOP CHECKLIST - NOTHING BY MOUTH SINCE
04-Apr-2021 22:30 Bexarotene Counseling:  I discussed with the patient the risks of bexarotene including but not limited to hair loss, dry lips/skin/eyes, liver abnormalities, hyperlipidemia, pancreatitis, depression/suicidal ideation, photosensitivity, drug rash/allergic reactions, hypothyroidism, anemia, leukopenia, infection, cataracts, and teratogenicity.  Patient understands that they will need regular blood tests to check lipid profile, liver function tests, white blood cell count, thyroid function tests and pregnancy test if applicable.

## 2023-09-28 RX ORDER — SEMAGLUTIDE 1.34 MG/ML
4 INJECTION, SOLUTION SUBCUTANEOUS
Refills: 0 | Status: ACTIVE | COMMUNITY

## 2023-10-02 DIAGNOSIS — Z12.11 ENCOUNTER FOR SCREENING FOR MALIGNANT NEOPLASM OF COLON: ICD-10-CM

## 2024-01-16 ENCOUNTER — APPOINTMENT (OUTPATIENT)
Dept: SURGERY | Facility: CLINIC | Age: 49
End: 2024-01-16
Payer: COMMERCIAL

## 2024-01-16 PROCEDURE — 45378 DIAGNOSTIC COLONOSCOPY: CPT

## 2024-11-13 ENCOUNTER — APPOINTMENT (OUTPATIENT)
Dept: UROGYNECOLOGY | Facility: CLINIC | Age: 49
End: 2024-11-13
Payer: COMMERCIAL

## 2024-11-13 VITALS
WEIGHT: 123 LBS | SYSTOLIC BLOOD PRESSURE: 107 MMHG | DIASTOLIC BLOOD PRESSURE: 70 MMHG | HEART RATE: 75 BPM | BODY MASS INDEX: 19.77 KG/M2 | HEIGHT: 66 IN

## 2024-11-13 DIAGNOSIS — N81.11 CYSTOCELE, MIDLINE: ICD-10-CM

## 2024-11-13 DIAGNOSIS — N81.4 UTEROVAGINAL PROLAPSE, UNSPECIFIED: ICD-10-CM

## 2024-11-13 DIAGNOSIS — N81.6 RECTOCELE: ICD-10-CM

## 2024-11-13 DIAGNOSIS — N39.46 MIXED INCONTINENCE: ICD-10-CM

## 2024-11-13 PROCEDURE — 99459 PELVIC EXAMINATION: CPT

## 2024-11-13 PROCEDURE — 51701 INSERT BLADDER CATHETER: CPT

## 2024-11-13 PROCEDURE — 99204 OFFICE O/P NEW MOD 45 MIN: CPT | Mod: 25

## 2024-11-14 DIAGNOSIS — Z82.49 FAMILY HISTORY OF ISCHEMIC HEART DISEASE AND OTHER DISEASES OF THE CIRCULATORY SYSTEM: ICD-10-CM

## 2024-11-14 DIAGNOSIS — Z83.511 FAMILY HISTORY OF GLAUCOMA: ICD-10-CM

## 2024-11-14 DIAGNOSIS — E66.9 OBESITY, UNSPECIFIED: ICD-10-CM

## 2024-11-14 DIAGNOSIS — Z83.79 FAMILY HISTORY OF OTHER DISEASES OF THE DIGESTIVE SYSTEM: ICD-10-CM

## 2024-11-14 DIAGNOSIS — Z81.8 FAMILY HISTORY OF OTHER MENTAL AND BEHAVIORAL DISORDERS: ICD-10-CM

## 2024-11-14 DIAGNOSIS — K46.9 UNSPECIFIED ABDOMINAL HERNIA W/OUT OBSTRUCTION OR GANGRENE: ICD-10-CM

## 2024-11-14 DIAGNOSIS — K59.00 CONSTIPATION, UNSPECIFIED: ICD-10-CM

## 2024-11-14 DIAGNOSIS — R58 HEMORRHAGE, NOT ELSEWHERE CLASSIFIED: ICD-10-CM

## 2024-11-14 DIAGNOSIS — Z83.49 FAMILY HISTORY OF OTHER ENDOCRINE, NUTRITIONAL AND METABOLIC DISEASES: ICD-10-CM

## 2024-11-15 LAB — BACTERIA UR CULT: NORMAL

## 2024-11-18 ENCOUNTER — NON-APPOINTMENT (OUTPATIENT)
Age: 49
End: 2024-11-18

## 2024-11-18 LAB
APPEARANCE: CLEAR
BACTERIA: NEGATIVE /HPF
BILIRUB UR QL STRIP: NORMAL
BILIRUBIN URINE: ABNORMAL
BLOOD URINE: NEGATIVE
CLARITY UR: CLEAR
COLLECTION METHOD: NORMAL
COLOR: ABNORMAL
GLUCOSE QUALITATIVE U: NEGATIVE
GLUCOSE UR-MCNC: NORMAL
HCG UR QL: 0.2 EU/DL
HGB UR QL STRIP.AUTO: ABNORMAL
KETONES UR-MCNC: NORMAL
KETONES URINE: NEGATIVE
LEUKOCYTE ESTERASE UR QL STRIP: NORMAL
LEUKOCYTE ESTERASE URINE: NEGATIVE
MICROSCOPIC-UA: NORMAL
NITRITE UR QL STRIP: NORMAL
NITRITE URINE: POSITIVE
PH UR STRIP: 5.5
PH URINE: 6
PROT UR STRIP-MCNC: 30
PROTEIN URINE: ABNORMAL
RED BLOOD CELLS URINE: 1 /HPF
SP GR UR STRIP: 1.03
SPECIFIC GRAVITY URINE: >=1.03
SQUAMOUS EPITHELIAL CELLS: PRESENT
UROBILINOGEN URINE: NORMAL
WHITE BLOOD CELLS URINE: 5 /HPF

## 2024-12-04 ENCOUNTER — TRANSCRIPTION ENCOUNTER (OUTPATIENT)
Age: 49
End: 2024-12-04

## 2024-12-04 ENCOUNTER — APPOINTMENT (OUTPATIENT)
Dept: UROGYNECOLOGY | Facility: CLINIC | Age: 49
End: 2024-12-04
Payer: COMMERCIAL

## 2024-12-04 ENCOUNTER — OUTPATIENT (OUTPATIENT)
Dept: OUTPATIENT SERVICES | Facility: HOSPITAL | Age: 49
LOS: 1 days | End: 2024-12-04
Payer: COMMERCIAL

## 2024-12-04 VITALS — HEART RATE: 69 BPM | SYSTOLIC BLOOD PRESSURE: 103 MMHG | DIASTOLIC BLOOD PRESSURE: 75 MMHG

## 2024-12-04 DIAGNOSIS — N83.209 UNSPECIFIED OVARIAN CYST, UNSPECIFIED SIDE: Chronic | ICD-10-CM

## 2024-12-04 DIAGNOSIS — Z98.890 OTHER SPECIFIED POSTPROCEDURAL STATES: Chronic | ICD-10-CM

## 2024-12-04 DIAGNOSIS — Z97.5 PRESENCE OF (INTRAUTERINE) CONTRACEPTIVE DEVICE: Chronic | ICD-10-CM

## 2024-12-04 DIAGNOSIS — Z01.818 ENCOUNTER FOR OTHER PREPROCEDURAL EXAMINATION: ICD-10-CM

## 2024-12-04 DIAGNOSIS — N39.3 STRESS INCONTINENCE (FEMALE) (MALE): ICD-10-CM

## 2024-12-04 PROCEDURE — 51729 CYSTOMETROGRAM W/VP&UP: CPT | Mod: 26

## 2024-12-04 PROCEDURE — 51797 INTRAABDOMINAL PRESSURE TEST: CPT

## 2024-12-04 PROCEDURE — 51784 ANAL/URINARY MUSCLE STUDY: CPT | Mod: 26

## 2024-12-04 PROCEDURE — 51729 CYSTOMETROGRAM W/VP&UP: CPT

## 2024-12-04 PROCEDURE — 51797 INTRAABDOMINAL PRESSURE TEST: CPT | Mod: 26

## 2024-12-04 PROCEDURE — 51784 ANAL/URINARY MUSCLE STUDY: CPT

## 2024-12-16 ENCOUNTER — APPOINTMENT (OUTPATIENT)
Dept: UROGYNECOLOGY | Facility: CLINIC | Age: 49
End: 2024-12-16
Payer: COMMERCIAL

## 2024-12-16 DIAGNOSIS — N39.3 STRESS INCONTINENCE (FEMALE) (MALE): ICD-10-CM

## 2024-12-16 DIAGNOSIS — K42.9 UMBILICAL HERNIA W/OUT OBSTRUCTION OR GANGRENE: ICD-10-CM

## 2024-12-16 DIAGNOSIS — N81.6 RECTOCELE: ICD-10-CM

## 2024-12-16 DIAGNOSIS — N81.11 CYSTOCELE, MIDLINE: ICD-10-CM

## 2024-12-16 DIAGNOSIS — N81.4 UTEROVAGINAL PROLAPSE, UNSPECIFIED: ICD-10-CM

## 2024-12-16 PROCEDURE — 99213 OFFICE O/P EST LOW 20 MIN: CPT

## 2025-02-25 ENCOUNTER — APPOINTMENT (OUTPATIENT)
Dept: UROGYNECOLOGY | Facility: CLINIC | Age: 50
End: 2025-02-25
Payer: COMMERCIAL

## 2025-02-25 VITALS
WEIGHT: 116 LBS | HEIGHT: 66 IN | HEART RATE: 74 BPM | DIASTOLIC BLOOD PRESSURE: 73 MMHG | SYSTOLIC BLOOD PRESSURE: 110 MMHG | BODY MASS INDEX: 18.64 KG/M2

## 2025-02-25 DIAGNOSIS — N81.4 UTEROVAGINAL PROLAPSE, UNSPECIFIED: ICD-10-CM

## 2025-02-25 DIAGNOSIS — N81.6 RECTOCELE: ICD-10-CM

## 2025-02-25 DIAGNOSIS — N39.46 MIXED INCONTINENCE: ICD-10-CM

## 2025-02-25 DIAGNOSIS — N81.11 CYSTOCELE, MIDLINE: ICD-10-CM

## 2025-02-25 PROCEDURE — 51701 INSERT BLADDER CATHETER: CPT

## 2025-02-25 PROCEDURE — 99459 PELVIC EXAMINATION: CPT

## 2025-02-25 PROCEDURE — 99214 OFFICE O/P EST MOD 30 MIN: CPT | Mod: 25

## 2025-02-27 ENCOUNTER — NON-APPOINTMENT (OUTPATIENT)
Age: 50
End: 2025-02-27

## 2025-02-27 LAB — BACTERIA UR CULT: NORMAL

## 2025-02-28 ENCOUNTER — APPOINTMENT (OUTPATIENT)
Dept: SURGERY | Facility: CLINIC | Age: 50
End: 2025-02-28
Payer: COMMERCIAL

## 2025-02-28 ENCOUNTER — OUTPATIENT (OUTPATIENT)
Dept: OUTPATIENT SERVICES | Facility: HOSPITAL | Age: 50
LOS: 1 days | End: 2025-02-28
Payer: COMMERCIAL

## 2025-02-28 VITALS
TEMPERATURE: 97.7 F | SYSTOLIC BLOOD PRESSURE: 104 MMHG | WEIGHT: 116 LBS | RESPIRATION RATE: 16 BRPM | HEART RATE: 82 BPM | HEIGHT: 66 IN | DIASTOLIC BLOOD PRESSURE: 71 MMHG | BODY MASS INDEX: 18.64 KG/M2 | OXYGEN SATURATION: 99 %

## 2025-02-28 VITALS
OXYGEN SATURATION: 99 % | DIASTOLIC BLOOD PRESSURE: 70 MMHG | TEMPERATURE: 98 F | WEIGHT: 115.08 LBS | HEIGHT: 65.75 IN | SYSTOLIC BLOOD PRESSURE: 103 MMHG | RESPIRATION RATE: 16 BRPM | HEART RATE: 70 BPM

## 2025-02-28 DIAGNOSIS — K42.9 UMBILICAL HERNIA WITHOUT OBSTRUCTION OR GANGRENE: ICD-10-CM

## 2025-02-28 DIAGNOSIS — N39.3 STRESS INCONTINENCE (FEMALE) (MALE): ICD-10-CM

## 2025-02-28 DIAGNOSIS — K42.9 UMBILICAL HERNIA W/OUT OBSTRUCTION OR GANGRENE: ICD-10-CM

## 2025-02-28 DIAGNOSIS — Z98.890 OTHER SPECIFIED POSTPROCEDURAL STATES: Chronic | ICD-10-CM

## 2025-02-28 DIAGNOSIS — Z97.5 PRESENCE OF (INTRAUTERINE) CONTRACEPTIVE DEVICE: Chronic | ICD-10-CM

## 2025-02-28 DIAGNOSIS — N83.209 UNSPECIFIED OVARIAN CYST, UNSPECIFIED SIDE: Chronic | ICD-10-CM

## 2025-02-28 LAB
A1C WITH ESTIMATED AVERAGE GLUCOSE RESULT: 4.8 % — SIGNIFICANT CHANGE UP (ref 4–5.6)
ANION GAP SERPL CALC-SCNC: 10 MMOL/L — SIGNIFICANT CHANGE UP (ref 5–17)
BLD GP AB SCN SERPL QL: NEGATIVE — SIGNIFICANT CHANGE UP
BUN SERPL-MCNC: 16 MG/DL — SIGNIFICANT CHANGE UP (ref 7–23)
CALCIUM SERPL-MCNC: 9.9 MG/DL — SIGNIFICANT CHANGE UP (ref 8.4–10.5)
CHLORIDE SERPL-SCNC: 103 MMOL/L — SIGNIFICANT CHANGE UP (ref 96–108)
CO2 SERPL-SCNC: 26 MMOL/L — SIGNIFICANT CHANGE UP (ref 22–31)
CREAT SERPL-MCNC: 0.78 MG/DL — SIGNIFICANT CHANGE UP (ref 0.5–1.3)
EGFR: 93 ML/MIN/1.73M2 — SIGNIFICANT CHANGE UP
ESTIMATED AVERAGE GLUCOSE: 91 MG/DL — SIGNIFICANT CHANGE UP (ref 68–114)
GLUCOSE SERPL-MCNC: 78 MG/DL — SIGNIFICANT CHANGE UP (ref 70–99)
HCT VFR BLD CALC: 37 % — SIGNIFICANT CHANGE UP (ref 34.5–45)
HGB BLD-MCNC: 12.3 G/DL — SIGNIFICANT CHANGE UP (ref 11.5–15.5)
MCHC RBC-ENTMCNC: 29.4 PG — SIGNIFICANT CHANGE UP (ref 27–34)
MCHC RBC-ENTMCNC: 33.2 G/DL — SIGNIFICANT CHANGE UP (ref 32–36)
MCV RBC AUTO: 88.5 FL — SIGNIFICANT CHANGE UP (ref 80–100)
MRSA PCR RESULT.: SIGNIFICANT CHANGE UP
NRBC BLD AUTO-RTO: 0 /100 WBCS — SIGNIFICANT CHANGE UP (ref 0–0)
PLATELET # BLD AUTO: 253 K/UL — SIGNIFICANT CHANGE UP (ref 150–400)
POTASSIUM SERPL-MCNC: 4.8 MMOL/L — SIGNIFICANT CHANGE UP (ref 3.5–5.3)
POTASSIUM SERPL-SCNC: 4.8 MMOL/L — SIGNIFICANT CHANGE UP (ref 3.5–5.3)
RBC # BLD: 4.18 M/UL — SIGNIFICANT CHANGE UP (ref 3.8–5.2)
RBC # FLD: 12.4 % — SIGNIFICANT CHANGE UP (ref 10.3–14.5)
RH IG SCN BLD-IMP: POSITIVE — SIGNIFICANT CHANGE UP
S AUREUS DNA NOSE QL NAA+PROBE: DETECTED
SODIUM SERPL-SCNC: 139 MMOL/L — SIGNIFICANT CHANGE UP (ref 135–145)
WBC # BLD: 5.92 K/UL — SIGNIFICANT CHANGE UP (ref 3.8–10.5)
WBC # FLD AUTO: 5.92 K/UL — SIGNIFICANT CHANGE UP (ref 3.8–10.5)

## 2025-02-28 PROCEDURE — 86900 BLOOD TYPING SEROLOGIC ABO: CPT

## 2025-02-28 PROCEDURE — 80048 BASIC METABOLIC PNL TOTAL CA: CPT

## 2025-02-28 PROCEDURE — 87641 MR-STAPH DNA AMP PROBE: CPT

## 2025-02-28 PROCEDURE — 99214 OFFICE O/P EST MOD 30 MIN: CPT

## 2025-02-28 PROCEDURE — 85027 COMPLETE CBC AUTOMATED: CPT

## 2025-02-28 PROCEDURE — 87640 STAPH A DNA AMP PROBE: CPT

## 2025-02-28 PROCEDURE — 83036 HEMOGLOBIN GLYCOSYLATED A1C: CPT

## 2025-02-28 PROCEDURE — 86850 RBC ANTIBODY SCREEN: CPT

## 2025-02-28 PROCEDURE — G0463: CPT

## 2025-02-28 PROCEDURE — 86901 BLOOD TYPING SEROLOGIC RH(D): CPT

## 2025-02-28 RX ORDER — CEFOTETAN DISODIUM 1 G
2 VIAL (EA) INJECTION ONCE
Refills: 0 | Status: DISCONTINUED | OUTPATIENT
Start: 2025-03-18 | End: 2025-03-20

## 2025-02-28 NOTE — H&P PST ADULT - HISTORY OF PRESENT ILLNESS
This is a pleasant 49 year old female with PMhx significant for h/o uterine fibroids, complex ovarian cyst , heavy menstrual bleeding; uterovaginal prolapse and mixed urinary incontinenc.   Patient presents to PST prior to her scheduled Laparoscopic Robotic Supracervical Hysterectomy, Laparoscopic Robotic Sacral Colpexy Posterior Colporraphy repair ; Cystoscopy with Dr Kayla Senior on 3/18/2025; Open Umbilical repair possible mesh with Dr Jaffe on 3/18/2025.     ? This is a pleasant 49 year old female with PMhx significant for Obesity, Anxiety,  Depression, H/O uterine fibroids, complex ovarian cyst , heavy menstrual bleeding , h/o D&C x 2; ; uterovaginal prolapse and mixed urinary incontinence, s/p Pilonidal cystectomy (2021).   Patient presents to [facility] prior to her scheduled Laparoscopic Robotic Supracervical Hysterectomy, Laparoscopic Robotic Sacral Colpexy Posterior Colporraphy repair ; Cystoscopy with Dr Kayla Senior on 3/18/2025; Open Umbilical repair possible mesh with Dr Jaffe on 3/18/2025.  Patient states LMP 1/8/2025 lasted for 10 days, denies any abdominal pain or cramping.  She also reports vaginal bulging and constant urge to urinate.  Patient states this situation is affecting her daily activities.  Patient has an umbilical hernia with associated discomfort when she was heavier.  Since she lost weight (currently on Ozempic , denies any side effects) umbilical hernia became less painful.    Patient sought consultation with Dr Senior and Dr Jaffe  who both recommended the above stated surgeries.       ? This is a pleasant 49 year old female in NAD with PMhx significant for Obesity, Anxiety,  Depression, H/O uterine fibroids, complex ovarian cyst , heavy menstrual bleeding , h/o D&C x 2; ; uterovaginal prolapse and mixed urinary incontinence, s/p Pilonidal cystectomy (2021).   Patient presents to [facility] prior to her scheduled Laparoscopic Robotic Supracervical Hysterectomy, Laparoscopic Robotic Sacral Colpexy Posterior Colporraphy repair ; Cystoscopy with Dr Kayla Senior on 3/18/2025; Open Umbilical repair possible mesh with Dr Jaffe on 3/18/2025.  Patient states history of heavy menstrual bleeding,  LMP 1/8/2025 lasted for 10 days, denies any abdominal pain or cramping.  She also reports vaginal bulging and constant urge to urinate which affects her daily activities.    Patient has an umbilical hernia with associated discomfort when she was heavier.  Since she lost weight (currently on Ozempic , denies any side effects) umbilical hernia became less painful.    Patient sought consultation with Dr Senior and Dr Jaffe  who both recommended the above stated surgeries.       ? This is a pleasant 49 year old female in NAD with PMhx significant for Obesity, Anxiety,  Depression, H/O uterine fibroids, complex ovarian cyst , heavy menstrual bleeding , h/o D&C x 2; ; uterovaginal prolapse and mixed urinary incontinence, s/p Pilonidal cystectomy (2021).   Patient presents to [facility] prior to her scheduled Laparoscopic Robotic Supracervical Hysterectomy, Laparoscopic Robotic Sacral Colpopexy Posterior Colporraphy repair ; Cystoscopy with Dr Kayla Senior on 3/18/2025; Open Umbilical repair possible mesh with Dr Jaffe on 3/18/2025.  Patient states history of heavy menstrual bleeding,  LMP 1/8/2025 lasted for 10 days, denies any abdominal pain or cramping.  She also reports vaginal bulging and constant urge to urinate which affects her daily activities.    Patient has an umbilical hernia with associated discomfort when she was heavier.  Since she lost weight (currently on Ozempic , denies any side effects) umbilical hernia became less painful.       ?

## 2025-02-28 NOTE — H&P PST ADULT - ALLERGIC/IMMUNOLOGIC
Labs, EKG and imaging were ordered, where indicated.  Independent interpretation of any labs, EKG & imaging that was ordered was performed by me, Dr. Del Real. Appropriate medications for patient's presenting complaints were ordered and effects were reassessed, where indicated.  Patient's records (prior hospital, ED visit, and/or nursing home note) were reviewed, if available.  Additional history was obtained from EMS, family, and/or PCP (where available).  Escalation to admission/observation was considered.  However patient feels much better and patient/parent is comfortable with discharge.  Appropriate follow-up was arranged.     keller obstruction, cleared w/flushing - strict return precautions discussed, rec outpt uro f/u negative

## 2025-02-28 NOTE — H&P PST ADULT - ASSESSMENT
DASI score:  7.68  DASI activity:  active, just went on a college tour with her daughter; able to do household work/grocery shopping   Loose teeth or denture:  denies    DASI score:  7.68  DASI activity:  active, just went on a college tour with her daughter; able to do household work/grocery shopping   Loose teeth or denture:  denies     CAPRINI SCORE    AGE RELATED RISK FACTORS                                                             [ x ] Age 41-60 years                                            (1 Point)  [ ] Age: 61-74 years                                           (2 Points)                 [ ] Age= 75 years                                                (3 Points)             DISEASE RELATED RISK FACTORS                                                       [ ] Edema in the lower extremities                 (1 Point)                     [ ] Varicose veins                                               (1 Point)                                 [ ] BMI > 25 Kg/m2                                            (1 Point)                                  [ ] Serious infection (ie PNA)                            (1 Point)                     [ ] Lung disease ( COPD, Emphysema)            (1 Point)                                                                          [ ] Acute myocardial infarction                         (1 Point)                  [ ] Congestive heart failure (in the previous month)  (1 Point)         [ ] Inflammatory bowel disease                            (1 Point)                  [ ] Central venous access, PICC or Port               (2 points)       (within the last month)                                                                [ ] Stroke (in the previous month)                        (5 Points)    [ ] Previous or present malignancy                       (2 points)                                                                                                                                                         HEMATOLOGY RELATED FACTORS                                                         [ ] Prior episodes of VTE                                     (3 Points)                     [ ] Positive family history for VTE                      (3 Points)                  [ ] Prothrombin 65091 A                                     (3 Points)                     [ ] Factor V Leiden                                                (3 Points)                        [ ] Lupus anticoagulants                                      (3 Points)                                                           [ ] Anticardiolipin antibodies                              (3 Points)                                                       [ ] High homocysteine in the blood                   (3 Points)                                             [ ] Other congenital or acquired thrombophilia      (3 Points)                                                [ ] Heparin induced thrombocytopenia                  (3 Points)                                        MOBILITY RELATED FACTORS  [ ] Bed rest                                                         (1 Point)  [ ] Plaster cast                                                    (2 points)  [ ] Bed bound for more than 72 hours           (2 Points)    GENDER SPECIFIC FACTORS  [ ] Pregnancy or had a baby within the last month   (1 Point)  [ ] Post-partum < 6 weeks                                   (1 Point)  [ ] Hormonal therapy  or oral contraception   (1 Point)  [ ] History of pregnancy complications              (1 point)  [ ] Unexplained or recurrent              (1 Point)    OTHER RISK FACTORS                                           (1 Point)  [ ] BMI >40, smoking, diabetes requiring insulin, chemotherapy  blood transfusions and length of surgery over 2 hours    SURGERY RELATED RISK FACTORS  [ ]  Section within the last month     (1 Point)  [ ] Minor surgery                                                  (1 Point)  [ ] Arthroscopic surgery                                       (2 Points)  [  x] Planned major surgery lasting more            (2 Points)      than 45 minutes     [ ] Elective hip or knee joint replacement       (5 points)       surgery                                                TRAUMA RELATED RISK FACTORS  [ ] Fracture of the hip, pelvis, or leg                       (5 Points)  [ ] Spinal cord injury resulting in paralysis             (5 points)       (in the previous month)    [ ] Paralysis  (less than 1 month)                             (5 Points)  [ ] Multiple Trauma within 1 month                        (5 Points)    Total Score [  3      ]    Caprini Score 0-2: Low Risk, NO VTE prophylaxis required for most patients, encourage ambulation  Caprini Score 3-6: Moderate Risk , pharmacologic VTE prophylaxis is indicated for most patients (in the absence of contraindications)  Caprini Score Greater than or =7: High risk, pharmocologic VTE prophylaxis indicated for most patients (in the absence of contraindications)

## 2025-02-28 NOTE — H&P PST ADULT - NSICDXPASTMEDICALHX_GEN_ALL_CORE_FT
PAST MEDICAL HISTORY:  Anxiety     H/O pericarditis 2003  evaluated by pulmonologist at the time, no treatment required, eventually resolved without intervention    History of excision of pilonidal cyst age 19    Mixed incontinence urge and stress     Ruptured ovarian cyst PMH, resolved

## 2025-02-28 NOTE — H&P PST ADULT - PROBLEM SELECTOR PLAN 1
Laparoscopic Robotic Supracervical Hysterectomy, Laparoscopic Robotic Sacral Colpexy Posterior Colporraphy repair ; Cystoscopy with Dr Kayla Senior on 3/18/2025 Laparoscopic Robotic Supracervical Hysterectomy, Laparoscopic Robotic Sacral Colpexy Posterior Colporraphy repair ; Cystoscopy with Dr Kayla Senior on 3/18/2025  Pre op instructions given, Questions answered  Labs : CBC BMP A1C, T&S, MRSA   Hold ozempic 1 wk before surgery  last dose 3/10/2025

## 2025-02-28 NOTE — H&P PST ADULT - PROBLEM/PLAN-1
Patient was notified and medications were refilled   
Tamiko Vang Patient Age: 51 year old  MESSAGE: Interpreting service used: No      IM/FP- patient cannot get into her mychart to read her test results asking to speak to clinical      WEIGHT AND HEIGHT:   Wt Readings from Last 1 Encounters:   04/30/20 68.9 kg (152 lb)     Ht Readings from Last 1 Encounters:   04/30/20 5' (1.524 m)     BMI Readings from Last 1 Encounters:   04/30/20 29.69 kg/m²       ALLERGIES:  Morphine and Bactrim ds  Current Outpatient Medications   Medication   • atorvastatin (LIPITOR) 10 MG tablet   • metFORMIN (GLUCOPHAGE-XR) 500 MG 24 hr tablet   • blood glucose (ACCU-CHEK SMARTVIEW) test strip   • cyclobenzaprine (FLEXERIL) 5 MG tablet   • ivermectin 1 % cream   • metroNIDAZOLE (METROCREAM) 0.75 % cream   • ergocalciferol (DRISDOL) 8000 UNIT/ML solution   • omeprazole (PRILOSEC) 40 MG capsule     No current facility-administered medications for this visit.      PHARMACY to use:           Pharmacy preference(s) on file:   [id number] - [address] AT [facility] ([address]  [place] [address]  Phone: [phone] Fax: [phone]      CALL BACK INFO: Ok to leave response (including medical information) on answering machine  ROUTING: Patient's physician/staff        PCP: Luna Heath MD         INS: Payor: [employer]UE [employer]ELD [place] / Plan: PPO [id number] / Product Type: PPO MISC   PATIENT ADDRESS:  [address] [place] [address] [place] [address]    
DISPLAY PLAN FREE TEXT

## 2025-02-28 NOTE — H&P PST ADULT - HISTORY OF COVID-19 VACCINATION
Met with patient to discuss transitional planning. Plan is home with sister, denies needs.   Life Vest order faxed to [facility], called to rep Collins, let him know patient ready for DC    1700 patient being fit with Life Vest    Discharge [address] Case Management Department  Written by: Massiel Fong RN    Patient Name: [facility]  Attending Provider: Derick Meyer MD  Admit Date: 2021  2:12 PM  MRN: [id number]  Account: [de-identified]                     : 1959  Discharge Date:  2021        Disposition: home    Massiel Fong RN Yes

## 2025-02-28 NOTE — H&P PST ADULT - PROBLEM SELECTOR PLAN 2
Continue with wellbutrin, klonopin and xanax  Patient will take meds the night before surgery Continue with wellbutrin, klonopin, lexapro and xanax  Patient will take meds the night before surgery

## 2025-02-28 NOTE — H&P PST ADULT - NSICDXPASTSURGICALHX_GEN_ALL_CORE_FT
PAST SURGICAL HISTORY:  History of excision of pilonidal cyst age 19    Presence of IUD inserted secondary to menorrhagia    Ruptured ovarian cyst     S/P D&C (status post dilation and curettage) secondary to menorrhagia x2  2005  2011    S/P LASIK surgery

## 2025-03-03 DIAGNOSIS — A49.01 METHICILLIN SUSCEPTIBLE STAPHYLOCOCCUS AUREUS INFECTION, UNSPECIFIED SITE: ICD-10-CM

## 2025-03-03 RX ORDER — MUPIROCIN 20 MG/G
2 OINTMENT TOPICAL
Qty: 1 | Refills: 0 | Status: ACTIVE | COMMUNITY
Start: 2025-03-03 | End: 1900-01-01

## 2025-03-12 ENCOUNTER — TRANSCRIPTION ENCOUNTER (OUTPATIENT)
Age: 50
End: 2025-03-12

## 2025-03-18 ENCOUNTER — TRANSCRIPTION ENCOUNTER (OUTPATIENT)
Age: 50
End: 2025-03-18

## 2025-03-18 ENCOUNTER — APPOINTMENT (OUTPATIENT)
Dept: UROGYNECOLOGY | Facility: HOSPITAL | Age: 50
End: 2025-03-18
Payer: COMMERCIAL

## 2025-03-18 ENCOUNTER — APPOINTMENT (OUTPATIENT)
Dept: SURGERY | Facility: HOSPITAL | Age: 50
End: 2025-03-18
Payer: COMMERCIAL

## 2025-03-18 ENCOUNTER — INPATIENT (INPATIENT)
Facility: HOSPITAL | Age: 50
LOS: 1 days | Discharge: HOME | DRG: 395 | End: 2025-03-20
Attending: STUDENT IN AN ORGANIZED HEALTH CARE EDUCATION/TRAINING PROGRAM | Admitting: STUDENT IN AN ORGANIZED HEALTH CARE EDUCATION/TRAINING PROGRAM
Payer: COMMERCIAL

## 2025-03-18 VITALS
DIASTOLIC BLOOD PRESSURE: 77 MMHG | HEART RATE: 73 BPM | HEIGHT: 64.49 IN | OXYGEN SATURATION: 100 % | TEMPERATURE: 98 F | WEIGHT: 115.08 LBS | SYSTOLIC BLOOD PRESSURE: 111 MMHG | RESPIRATION RATE: 17 BRPM

## 2025-03-18 DIAGNOSIS — Z98.890 OTHER SPECIFIED POSTPROCEDURAL STATES: Chronic | ICD-10-CM

## 2025-03-18 DIAGNOSIS — N39.3 STRESS INCONTINENCE (FEMALE) (MALE): ICD-10-CM

## 2025-03-18 DIAGNOSIS — Z97.5 PRESENCE OF (INTRAUTERINE) CONTRACEPTIVE DEVICE: Chronic | ICD-10-CM

## 2025-03-18 DIAGNOSIS — K42.9 UMBILICAL HERNIA WITHOUT OBSTRUCTION OR GANGRENE: ICD-10-CM

## 2025-03-18 DIAGNOSIS — N83.209 UNSPECIFIED OVARIAN CYST, UNSPECIFIED SIDE: Chronic | ICD-10-CM

## 2025-03-18 LAB
GLUCOSE BLDC GLUCOMTR-MCNC: 139 MG/DL — HIGH (ref 70–99)
GLUCOSE BLDC GLUCOMTR-MCNC: 66 MG/DL — LOW (ref 70–99)

## 2025-03-18 PROCEDURE — 88305 TISSUE EXAM BY PATHOLOGIST: CPT | Mod: 26

## 2025-03-18 PROCEDURE — 57425 LAPAROSCOPY SURG COLPOPEXY: CPT

## 2025-03-18 PROCEDURE — 57250 REPAIR RECTUM & VAGINA: CPT

## 2025-03-18 PROCEDURE — 49591 RPR AA HRN 1ST < 3 CM RDC: CPT

## 2025-03-18 PROCEDURE — 57288 REPAIR BLADDER DEFECT: CPT

## 2025-03-18 DEVICE — SURGIFLO MATRIX WITH THROMBIN KIT: Type: IMPLANTABLE DEVICE | Status: FUNCTIONAL

## 2025-03-18 DEVICE — MESH UPSYLON Y: Type: IMPLANTABLE DEVICE | Status: FUNCTIONAL

## 2025-03-18 DEVICE — SLING SOLYX BLUE SINGLE INCISION: Type: IMPLANTABLE DEVICE | Status: FUNCTIONAL

## 2025-03-18 RX ORDER — SENNA 187 MG
2 TABLET ORAL AT BEDTIME
Refills: 0 | Status: DISCONTINUED | OUTPATIENT
Start: 2025-03-18 | End: 2025-03-20

## 2025-03-18 RX ORDER — BUPROPION HYDROBROMIDE 522 MG/1
1 TABLET, EXTENDED RELEASE ORAL
Refills: 0 | DISCHARGE

## 2025-03-18 RX ORDER — IBUPROFEN 200 MG
1 TABLET ORAL
Qty: 0 | Refills: 0 | DISCHARGE
Start: 2025-03-18

## 2025-03-18 RX ORDER — ACETAMINOPHEN 500 MG/5ML
2 LIQUID (ML) ORAL
Qty: 0 | Refills: 0 | DISCHARGE
Start: 2025-03-18

## 2025-03-18 RX ORDER — SODIUM CHLORIDE 9 G/1000ML
1000 INJECTION, SOLUTION INTRAVENOUS
Refills: 0 | Status: DISCONTINUED | OUTPATIENT
Start: 2025-03-18 | End: 2025-03-19

## 2025-03-18 RX ORDER — ONDANSETRON HCL/PF 4 MG/2 ML
4 VIAL (ML) INJECTION ONCE
Refills: 0 | Status: DISCONTINUED | OUTPATIENT
Start: 2025-03-18 | End: 2025-03-18

## 2025-03-18 RX ORDER — ACETAMINOPHEN 500 MG/5ML
1000 LIQUID (ML) ORAL ONCE
Refills: 0 | Status: COMPLETED | OUTPATIENT
Start: 2025-03-18 | End: 2025-03-18

## 2025-03-18 RX ORDER — OXYCODONE HYDROCHLORIDE 30 MG/1
1 TABLET ORAL
Qty: 0 | Refills: 0 | DISCHARGE
Start: 2025-03-18

## 2025-03-18 RX ORDER — HYDROMORPHONE/SOD CHLOR,ISO/PF 2 MG/10 ML
0.5 SYRINGE (ML) INJECTION
Refills: 0 | Status: DISCONTINUED | OUTPATIENT
Start: 2025-03-18 | End: 2025-03-18

## 2025-03-18 RX ORDER — ESCITALOPRAM OXALATE 20 MG/1
20 TABLET ORAL DAILY
Refills: 0 | Status: DISCONTINUED | OUTPATIENT
Start: 2025-03-18 | End: 2025-03-20

## 2025-03-18 RX ORDER — LIDOCAINE HCL/PF 10 MG/ML
0.2 VIAL (ML) INJECTION ONCE
Refills: 0 | Status: DISCONTINUED | OUTPATIENT
Start: 2025-03-18 | End: 2025-03-18

## 2025-03-18 RX ORDER — IBUPROFEN 200 MG
600 TABLET ORAL EVERY 6 HOURS
Refills: 0 | Status: DISCONTINUED | OUTPATIENT
Start: 2025-03-18 | End: 2025-03-20

## 2025-03-18 RX ORDER — OXYCODONE HYDROCHLORIDE 30 MG/1
1 TABLET ORAL
Qty: 10 | Refills: 0
Start: 2025-03-18

## 2025-03-18 RX ORDER — OXYCODONE HYDROCHLORIDE 30 MG/1
5 TABLET ORAL EVERY 4 HOURS
Refills: 0 | Status: DISCONTINUED | OUTPATIENT
Start: 2025-03-18 | End: 2025-03-20

## 2025-03-18 RX ORDER — BUPROPION HYDROBROMIDE 522 MG/1
300 TABLET, EXTENDED RELEASE ORAL DAILY
Refills: 0 | Status: DISCONTINUED | OUTPATIENT
Start: 2025-03-18 | End: 2025-03-20

## 2025-03-18 RX ORDER — GABAPENTIN 400 MG/1
600 CAPSULE ORAL ONCE
Refills: 0 | Status: COMPLETED | OUTPATIENT
Start: 2025-03-18 | End: 2025-03-18

## 2025-03-18 RX ORDER — ORAL SEMAGLUTIDE 14 MG/1
0.5 TABLET ORAL
Refills: 0 | DISCHARGE

## 2025-03-18 RX ORDER — DIAZEPAM 2 MG/1
1 TABLET ORAL
Qty: 5 | Refills: 0
Start: 2025-03-18

## 2025-03-18 RX ORDER — PHENAZOPYRIDINE HCL 100 MG
400 TABLET ORAL THREE TIMES A DAY
Refills: 0 | Status: DISCONTINUED | OUTPATIENT
Start: 2025-03-18 | End: 2025-03-19

## 2025-03-18 RX ORDER — ACETAMINOPHEN 500 MG/5ML
1000 LIQUID (ML) ORAL EVERY 6 HOURS
Refills: 0 | Status: DISCONTINUED | OUTPATIENT
Start: 2025-03-18 | End: 2025-03-20

## 2025-03-18 RX ADMIN — Medication 1000 MILLIGRAM(S): at 18:23

## 2025-03-18 RX ADMIN — GABAPENTIN 600 MILLIGRAM(S): 400 CAPSULE ORAL at 12:29

## 2025-03-18 RX ADMIN — Medication 600 MILLIGRAM(S): at 23:30

## 2025-03-18 RX ADMIN — Medication 1000 MILLIGRAM(S): at 12:29

## 2025-03-18 RX ADMIN — Medication 600 MILLIGRAM(S): at 18:23

## 2025-03-18 NOTE — DISCHARGE NOTE PROVIDER - HOSPITAL COURSE
49y yo F s/p Robot-assisted laparoscopic supracervical hysterectomy with sacrocolpopexy, bilateral salpingectomy, posterior repair, midurethral sling with cystoscopy, and umbilical hernia repair. See operative report for full details. EBL:100    POD #0, pt was advanced to a regular diet.   POD#1, pt was discharged in stable condition, ambulating, tolerating po and passing trial of void and voiding spontaneously. Pt to have close follow-up w/ Dr. Senior in clinic on April 9th at 11am. 49y yo F s/p Robot-assisted laparoscopic supracervical hysterectomy with sacrocolpopexy, bilateral salpingectomy, posterior repair, midurethral sling with cystoscopy, and umbilical hernia repair. See operative report for full details. EBL:100    POD #0, pt was advanced to a regular diet.   POD#1 patient was noted to be hypotensive and had an initial drop in Hct on AM labs. Patient received 1upRBC and responded appropriately. Vital signs otherwise remained stable all day. Patient passed her trial of void  POD#1, pt was discharged in stable condition, ambulating, tolerating po and passing trial of void and voiding spontaneously. Pt to have close follow-up w/ Dr. Senior in clinic on April 9th at 11am.

## 2025-03-18 NOTE — BRIEF OPERATIVE NOTE - NSICDXBRIEFPROCEDURE_GEN_ALL_CORE_FT
PROCEDURES:  Repair of umbilical hernia with lipectomy 18-Mar-2025 17:09:41  Kvng Gill  
PROCEDURES:  Robot-assisted laparoscopic supracervical hysterectomy with sacrocolpopexy 18-Mar-2025 17:30:32  Kimberlee Bojorquez  Robot-assisted bilateral salpingectomy 18-Mar-2025 17:30:40  Kimberlee Bojorquez  Posterior repair of vagina 18-Mar-2025 17:30:46  Kimberlee Bojorquez  Creation of midurethral sling with cystoscopy 18-Mar-2025 17:30:53  Kimberlee Bojorquez

## 2025-03-18 NOTE — DISCHARGE NOTE PROVIDER - NSDCCPCAREPLAN_GEN_ALL_CORE_FT
PRINCIPAL DISCHARGE DIAGNOSIS  Diagnosis: Umbilical hernia  Assessment and Plan of Treatment:       SECONDARY DISCHARGE DIAGNOSES  Diagnosis: Umbilical hernia  Assessment and Plan of Treatment:

## 2025-03-18 NOTE — BRIEF OPERATIVE NOTE - OPERATION/FINDINGS
Primary repair of umbilical hernia with figure of eight x3.
1. Normal appearing external genitalia  2. Cystoscopy with normal appearing bladder mucosa. No injury, suture, mesh or foreign body noted within bladder or urethra. Efflux of clear yellow urine noted from bilateral ureteral orifices.  3. Rectal exam without suture or injury

## 2025-03-18 NOTE — BRIEF OPERATIVE NOTE - NSICDXBRIEFPREOP_GEN_ALL_CORE_FT
PRE-OP DIAGNOSIS:  Hernia, umbilical 18-Mar-2025 17:09:52  Kvng Gill  
PRE-OP DIAGNOSIS:  Incomplete uterovaginal prolapse 18-Mar-2025 17:31:28  Kimberlee Bojorquez  Rectocele 18-Mar-2025 17:31:33  Kimberlee Bojorquez  Midline cystocele 18-Mar-2025 17:31:37  Kimberlee Bojorquez  Primary stress urinary incontinence 18-Mar-2025 17:31:44  Kimberlee Bojorquez

## 2025-03-18 NOTE — CHART NOTE - NSCHARTNOTEFT_GEN_A_CORE
POST-OPERATIVE NOTE    Subjective:  Patient is s/p umbilical hernia repair in addition RA hysterectomy and salpingectomy with gyn team . Noting suprapubic fullness and feeling of incomplete voiding despite apple in place. otherwise tolerating regular diet, denies nausea, vomiting, chest pain, SOB.    Vital Signs Last 24 Hrs  T(C): 36.5 (18 Mar 2025 20:38), Max: 36.6 (18 Mar 2025 12:09)  T(F): 97.7 (18 Mar 2025 20:38), Max: 97.9 (18 Mar 2025 12:09)  HR: 72 (18 Mar 2025 20:38) (70 - 78)  BP: 92/56 (18 Mar 2025 20:38) (91/53 - 111/77)  BP(mean): 76 (18 Mar 2025 20:00) (67 - 78)  RR: 18 (18 Mar 2025 20:38) (16 - 18)  SpO2: 99% (18 Mar 2025 20:38) (99% - 100%)    Parameters below as of 18 Mar 2025 20:38  Patient On (Oxygen Delivery Method): room air      I&O's Detail    18 Mar 2025 07:01  -  18 Mar 2025 23:22  --------------------------------------------------------  IN:    Lactated Ringers: 225 mL    Oral Fluid: 500 mL  Total IN: 725 mL    OUT:    Indwelling Catheter - Urethral (mL): 265 mL  Total OUT: 265 mL    Total NET: 460 mL        cefoTEtan  IVPB 2  cefoTEtan  IVPB 2    PAST MEDICAL & SURGICAL HISTORY:  History of excision of pilonidal cyst  age 19      Anxiety      Ruptured ovarian cyst  PMH, resolved      H/O pericarditis  2003  evaluated by pulmonologist at the time, no treatment required, eventually resolved without intervention      Mixed incontinence urge and stress      S/P LASIK surgery      History of excision of pilonidal cyst  age 19      S/P D&C (status post dilation and curettage)  secondary to menorrhagia x2  2005 2011      Ruptured ovarian cyst      Presence of IUD  inserted secondary to menorrhagia            Physical Exam:  General: NAD, resting comfortably in bed  Pulmonary: Nonlabored breathing, no respiratory distress  Cardiovascular: NSR  Abdominal: soft, mild incisional TTP, incisions cdi with overlying dermabond, mild distension suprapubically  : Apple draining clear urine  Extremities: WWP      Radiology and Additional Studies:    Assessment:  The patient is a 49y Female who is now several hours post-op from a umbilical hernia repair in addition RA hysterectomy and salpingectomy with gyn team     Plan:  - recommend bladder scan and to ensure apple working appropriately, communicated with gyn team  - regular diet  - multimodal pain control  - global care per gyn    [facility]   [id number]

## 2025-03-18 NOTE — PATIENT PROFILE ADULT - FALL HARM RISK - UNIVERSAL INTERVENTIONS
Bed in lowest position, wheels locked, appropriate side rails in place/Call bell, personal items and telephone in reach/Instruct patient to call for assistance before getting out of bed or chair/Non-slip footwear when patient is out of bed/[place] to call system/Physically safe environment - no spills, clutter or unnecessary equipment/Purposeful Proactive Rounding/Room/bathroom lighting operational, light cord in reach

## 2025-03-18 NOTE — DISCHARGE NOTE PROVIDER - NSDCMRMEDTOKEN_GEN_ALL_CORE_FT
acetaminophen 500 mg oral tablet: 2 tab(s) orally every 6 hours  ibuprofen 600 mg oral tablet: 1 tab(s) orally every 6 hours  KlonoPIN 0.5 mg oral tablet: 1 tab(s) orally once a day (at bedtime), As Needed  Lexapro 20 mg oral tablet: 1 tab(s) orally once a day  oxyCODONE 5 mg oral tablet: 1 tab(s) orally every 4 hours As needed Severe Pain (7 - 10)  Wellbutrin  mg/24 hours oral tablet, extended release: 1 tab(s) orally once a day  Xanax 0.25 mg oral tablet: 1 tab(s) orally 3 times a day, As Needed   acetaminophen 500 mg oral tablet: 2 tab(s) orally every 6 hours  ibuprofen 600 mg oral tablet: 1 tab(s) orally every 6 hours  KlonoPIN 0.5 mg oral tablet: 1 tab(s) orally once a day (at bedtime), As Needed  Lexapro 20 mg oral tablet: 1 tab(s) orally once a day  oxyCODONE 5 mg oral tablet: 1 tab(s) orally every 6 hours MDD: 4 tabs per day  Wellbutrin  mg/24 hours oral tablet, extended release: 1 tab(s) orally once a day  Xanax 0.25 mg oral tablet: 1 tab(s) orally 3 times a day, As Needed

## 2025-03-18 NOTE — CHART NOTE - NSCHARTNOTEFT_GEN_A_CORE
Patient seen and examined at bedside, recently post-op.    Currently denies any pain.  Has not yet ambulated  Ko catheter in place, making adequate UOP (150cc/2 hours)  Tolerating PO without difficulty  Only complaining of slight discomfort associated with the pressure of the vaginal packing.   Denies lightheadedness, dizziness, CP, SOB, palpitations, N/V, fevers, and chills.    Vital Signs Last 24 Hours  T(C): 36.5 (03-18-25 @ 17:40), Max: 36.6 (03-18-25 @ 12:09)  HR: 74 (03-18-25 @ 19:30) (70 - 78)  BP: 99/50 (03-18-25 @ 19:30) (91/53 - 111/77)  RR: 16 (03-18-25 @ 19:30) (16 - 17)  SpO2: 100% (03-18-25 @ 19:30) (99% - 100%)    I&O's Summary    18 Mar 2025 07:01  -  18 Mar 2025 19:47  --------------------------------------------------------  IN: 325 mL / OUT: 140 mL / NET: 185 mL        Physical Exam:  General: NAD  CV: NR, RR, S1, S2, no M/R/G  Lungs: CTA-B  Abdomen: Normoactive bowel sounds. Soft, appropriately tender, no rebound, no guarding  Incision: Laparoscopic port sites with overlying dermabond CDI  : Ko draining clear, nonbloody urine. No bleeding through the vaginal packing.  Ext: No calf tenderness or swelling bilaterally    Labs:      MEDICATIONS  (STANDING):  acetaminophen     Tablet .. 1000 milliGRAM(s) Oral every 6 hours  buPROPion XL (24-Hour) . 300 milliGRAM(s) Oral daily  cefoTEtan  IVPB 2 Gram(s) IV Intermittent once  escitalopram 20 milliGRAM(s) Oral daily  ibuprofen  Tablet. 600 milliGRAM(s) Oral every 6 hours  lactated ringers. 1000 milliLiter(s) (75 mL/Hr) IV Continuous <Continuous>    MEDICATIONS  (PRN):  HYDROmorphone  Injectable 0.5 milliGRAM(s) IV Push every 10 minutes PRN Moderate Pain (4 - 6)  ondansetron Injectable 4 milliGRAM(s) IV Push once PRN Nausea and/or Vomiting  oxyCODONE    IR 5 milliGRAM(s) Oral every 4 hours PRN Severe Pain (7 - 10)  senna 2 Tablet(s) Oral at bedtime PRN Constipation      48yo s/p RA-IRLANDA, SCP, Posterior repair, cysto, umbilical hernia repair recovering well in acute post-operative state.    Neuro/Psych: PO pain meds      - Cont. home Lexapro and Wellbutrin for hx of anxiety  CV: Hemodynamically stable  Pulm: Ecourage oob/ambulation, incentive spirometer at bedside  GI: Regular Diet       - Senna PRN  : Ko in place with vaginal packing      - Ko and Vaginal packing to be removed tomorrow AM  Heme: SCDs for DVT PPX  ID: afebrile  Endo: no active issues  Dispo: Continue routine post-op care    Discussed with Urogyn Fellow Dr. Reno Tripp, PGY-2

## 2025-03-18 NOTE — BRIEF OPERATIVE NOTE - NSICDXBRIEFPOSTOP_GEN_ALL_CORE_FT
POST-OP DIAGNOSIS:  Incomplete uterovaginal prolapse 18-Mar-2025 17:31:58  Kimberlee Bojorquez  Midline cystocele 18-Mar-2025 17:32:03  Kimberlee Bojorquez  Rectocele 18-Mar-2025 17:32:07  Kimberlee Bojorquez  Urinary, incontinence, stress female 18-Mar-2025 17:32:16  Kimberlee Bojorquez  
POST-OP DIAGNOSIS:  Umbilical hernia 18-Mar-2025 17:10:01  Kvng Gill

## 2025-03-18 NOTE — DISCHARGE NOTE PROVIDER - NSDCFUADDINST_GEN_ALL_CORE_FT
Postoperative Instructions    Bowel regimen  To avoid constipation and straining, we suggest the following (simultaneously):  1. Colace (stool softener) 100mg, one pill three times a day (breakfast, lunch, dinner). If stool is too soft, decrease to twice a day (breakfast, dinner)  If still constipated, you can add: Miralax once at bedtime  All of these agents can be obtained over the counter at the pharmacy.    Pain control  For pain control, take the followin. Motrin 800mg three times a day, take with food  2. Tylenol 1000mg every 6 hours.   3. Add Oxycodone 5mg every 6 hours as needed if still have pain despite Motrin and Tylenol.   3. Valium 5 mg as needed for spasms  Motrin and Tylenol can be obtained over the counter.    Postoperative urinary catheter  If you failed your voiding trial in the hospital and are sent home with a catheter, please call the office ([phone]) so you can come in to have a repeat voiding trial/catheter removal in a few days.    Postoperative restrictions  Nothing in the vagina (tampons, sexual intercourse), No tub baths, pools or hot tubs for 6 weeks (showers are ok!)  No lifting anything heavier than 10 lbs, no strenuous exercise for 6 weeks after surgery. Do not pull or cut any stitches that you see around your incision.    Vaginal bleeding  Spotting and intermittent passage of blood clots per vagina is normal in first few weeks after surgery. If you are soaking 1 pad per hour, that is not normal and you should notify my office and seek medical attention right away.    Vaginal discharge  Vaginal discharge (all colors) is normal after vaginal surgery. If you’ve had vaginal surgery, you have sutures in your vagina which take 3 months to fully absorb. You may have vaginal discharge during this time. This is normal

## 2025-03-18 NOTE — DISCHARGE NOTE PROVIDER - NSDCFUSCHEDAPPT_GEN_ALL_CORE_FT
Elliott Jaffe  [facility]  GENSURG [address]  Scheduled Appointment: 04/07/2025    [facility]  UROGYN [address]  Scheduled Appointment: 04/09/2025

## 2025-03-18 NOTE — DISCHARGE NOTE PROVIDER - CARE PROVIDER_API CALL
Kayla Senior  Urogyn and Reconst Pelvic Surg  [address]  [place] [address]  Phone: [phone]  Fax: [phone]  Follow Up Time: 2 weeks

## 2025-03-19 LAB
ANION GAP SERPL CALC-SCNC: 11 MMOL/L — SIGNIFICANT CHANGE UP (ref 5–17)
BASOPHILS # BLD AUTO: 0.01 K/UL — SIGNIFICANT CHANGE UP (ref 0–0.2)
BASOPHILS NFR BLD AUTO: 0.2 % — SIGNIFICANT CHANGE UP (ref 0–2)
BUN SERPL-MCNC: 7 MG/DL — SIGNIFICANT CHANGE UP (ref 7–23)
CALCIUM SERPL-MCNC: 8 MG/DL — LOW (ref 8.4–10.5)
CHLORIDE SERPL-SCNC: 100 MMOL/L — SIGNIFICANT CHANGE UP (ref 96–108)
CO2 SERPL-SCNC: 24 MMOL/L — SIGNIFICANT CHANGE UP (ref 22–31)
CREAT SERPL-MCNC: 0.62 MG/DL — SIGNIFICANT CHANGE UP (ref 0.5–1.3)
EGFR: 109 ML/MIN/1.73M2 — SIGNIFICANT CHANGE UP
EOSINOPHIL # BLD AUTO: 0 K/UL — SIGNIFICANT CHANGE UP (ref 0–0.5)
EOSINOPHIL NFR BLD AUTO: 0 % — SIGNIFICANT CHANGE UP (ref 0–6)
GLUCOSE BLDC GLUCOMTR-MCNC: 85 MG/DL — SIGNIFICANT CHANGE UP (ref 70–99)
GLUCOSE SERPL-MCNC: 92 MG/DL — SIGNIFICANT CHANGE UP (ref 70–99)
HCT VFR BLD CALC: 14 % — CRITICAL LOW (ref 34.5–45)
HCT VFR BLD CALC: 25.8 % — LOW (ref 34.5–45)
HCT VFR BLD CALC: 28.8 % — LOW (ref 34.5–45)
HCT VFR BLD CALC: 29 % — LOW (ref 34.5–45)
HGB BLD-MCNC: 4.5 G/DL — CRITICAL LOW (ref 11.5–15.5)
HGB BLD-MCNC: 8.6 G/DL — LOW (ref 11.5–15.5)
HGB BLD-MCNC: 9.7 G/DL — LOW (ref 11.5–15.5)
HGB BLD-MCNC: 9.8 G/DL — LOW (ref 11.5–15.5)
IMM GRANULOCYTES NFR BLD AUTO: 0.6 % — SIGNIFICANT CHANGE UP (ref 0–0.9)
LYMPHOCYTES # BLD AUTO: 0.79 K/UL — LOW (ref 1–3.3)
LYMPHOCYTES # BLD AUTO: 15.8 % — SIGNIFICANT CHANGE UP (ref 13–44)
MCHC RBC-ENTMCNC: 30.2 PG — SIGNIFICANT CHANGE UP (ref 27–34)
MCHC RBC-ENTMCNC: 30.4 PG — SIGNIFICANT CHANGE UP (ref 27–34)
MCHC RBC-ENTMCNC: 30.4 PG — SIGNIFICANT CHANGE UP (ref 27–34)
MCHC RBC-ENTMCNC: 30.6 PG — SIGNIFICANT CHANGE UP (ref 27–34)
MCHC RBC-ENTMCNC: 32.1 G/DL — SIGNIFICANT CHANGE UP (ref 32–36)
MCHC RBC-ENTMCNC: 33.3 G/DL — SIGNIFICANT CHANGE UP (ref 32–36)
MCHC RBC-ENTMCNC: 33.7 G/DL — SIGNIFICANT CHANGE UP (ref 32–36)
MCHC RBC-ENTMCNC: 33.8 G/DL — SIGNIFICANT CHANGE UP (ref 32–36)
MCV RBC AUTO: 90.1 FL — SIGNIFICANT CHANGE UP (ref 80–100)
MCV RBC AUTO: 90.5 FL — SIGNIFICANT CHANGE UP (ref 80–100)
MCV RBC AUTO: 94.6 FL — SIGNIFICANT CHANGE UP (ref 80–100)
MONOCYTES NFR BLD AUTO: 9 % — SIGNIFICANT CHANGE UP (ref 2–14)
NEUTROPHILS # BLD AUTO: 3.73 K/UL — SIGNIFICANT CHANGE UP (ref 1.8–7.4)
NEUTROPHILS NFR BLD AUTO: 74.4 % — SIGNIFICANT CHANGE UP (ref 43–77)
NRBC BLD AUTO-RTO: 0 /100 WBCS — SIGNIFICANT CHANGE UP (ref 0–0)
PLATELET # BLD AUTO: 124 K/UL — LOW (ref 150–400)
PLATELET # BLD AUTO: 201 K/UL — SIGNIFICANT CHANGE UP (ref 150–400)
PLATELET # BLD AUTO: 205 K/UL — SIGNIFICANT CHANGE UP (ref 150–400)
PLATELET # BLD AUTO: 215 K/UL — SIGNIFICANT CHANGE UP (ref 150–400)
POTASSIUM SERPL-MCNC: 3.8 MMOL/L — SIGNIFICANT CHANGE UP (ref 3.5–5.3)
POTASSIUM SERPL-SCNC: 3.8 MMOL/L — SIGNIFICANT CHANGE UP (ref 3.5–5.3)
RBC # BLD: 1.48 M/UL — LOW (ref 3.8–5.2)
RBC # BLD: 2.85 M/UL — LOW (ref 3.8–5.2)
RBC # BLD: 3.17 M/UL — LOW (ref 3.8–5.2)
RBC # BLD: 3.22 M/UL — LOW (ref 3.8–5.2)
RBC # FLD: 12.6 % — SIGNIFICANT CHANGE UP (ref 10.3–14.5)
RBC # FLD: 12.6 % — SIGNIFICANT CHANGE UP (ref 10.3–14.5)
RBC # FLD: 13 % — SIGNIFICANT CHANGE UP (ref 10.3–14.5)
SODIUM SERPL-SCNC: 135 MMOL/L — SIGNIFICANT CHANGE UP (ref 135–145)
WBC # BLD: 5.01 K/UL — SIGNIFICANT CHANGE UP (ref 3.8–10.5)
WBC # BLD: 7.05 K/UL — SIGNIFICANT CHANGE UP (ref 3.8–10.5)
WBC # BLD: 7.5 K/UL — SIGNIFICANT CHANGE UP (ref 3.8–10.5)
WBC # BLD: 8.66 K/UL — SIGNIFICANT CHANGE UP (ref 3.8–10.5)
WBC # FLD AUTO: 5.01 K/UL — SIGNIFICANT CHANGE UP (ref 3.8–10.5)
WBC # FLD AUTO: 7.05 K/UL — SIGNIFICANT CHANGE UP (ref 3.8–10.5)
WBC # FLD AUTO: 7.5 K/UL — SIGNIFICANT CHANGE UP (ref 3.8–10.5)
WBC # FLD AUTO: 8.66 K/UL — SIGNIFICANT CHANGE UP (ref 3.8–10.5)

## 2025-03-19 RX ORDER — DIPHENHYDRAMINE HCL 12.5MG/5ML
25 ELIXIR ORAL ONCE
Refills: 0 | Status: COMPLETED | OUTPATIENT
Start: 2025-03-19 | End: 2025-03-19

## 2025-03-19 RX ORDER — PHENAZOPYRIDINE HCL 100 MG
200 TABLET ORAL THREE TIMES A DAY
Refills: 0 | Status: DISCONTINUED | OUTPATIENT
Start: 2025-03-19 | End: 2025-03-20

## 2025-03-19 RX ORDER — OXYCODONE HYDROCHLORIDE 30 MG/1
10 TABLET ORAL EVERY 4 HOURS
Refills: 0 | Status: DISCONTINUED | OUTPATIENT
Start: 2025-03-19 | End: 2025-03-19

## 2025-03-19 RX ORDER — SIMETHICONE 80 MG
80 TABLET,CHEWABLE ORAL
Refills: 0 | Status: DISCONTINUED | OUTPATIENT
Start: 2025-03-19 | End: 2025-03-20

## 2025-03-19 RX ORDER — SODIUM CHLORIDE 9 G/1000ML
500 INJECTION, SOLUTION INTRAVENOUS ONCE
Refills: 0 | Status: COMPLETED | OUTPATIENT
Start: 2025-03-19 | End: 2025-03-19

## 2025-03-19 RX ORDER — OXYCODONE HYDROCHLORIDE 30 MG/1
1 TABLET ORAL
Qty: 8 | Refills: 0
Start: 2025-03-19 | End: 2025-03-20

## 2025-03-19 RX ADMIN — Medication 1000 MILLIGRAM(S): at 20:32

## 2025-03-19 RX ADMIN — OXYCODONE HYDROCHLORIDE 5 MILLIGRAM(S): 30 TABLET ORAL at 11:14

## 2025-03-19 RX ADMIN — Medication 80 MILLIGRAM(S): at 23:26

## 2025-03-19 RX ADMIN — Medication 25 MILLIGRAM(S): at 08:40

## 2025-03-19 RX ADMIN — SODIUM CHLORIDE 1000 MILLILITER(S): 9 INJECTION, SOLUTION INTRAVENOUS at 04:50

## 2025-03-19 RX ADMIN — ESCITALOPRAM OXALATE 20 MILLIGRAM(S): 20 TABLET ORAL at 11:16

## 2025-03-19 RX ADMIN — Medication 1000 MILLIGRAM(S): at 15:06

## 2025-03-19 RX ADMIN — Medication 80 MILLIGRAM(S): at 11:16

## 2025-03-19 RX ADMIN — Medication 600 MILLIGRAM(S): at 23:55

## 2025-03-19 RX ADMIN — Medication 200 MILLIGRAM(S): at 09:54

## 2025-03-19 RX ADMIN — Medication 600 MILLIGRAM(S): at 11:58

## 2025-03-19 RX ADMIN — Medication 600 MILLIGRAM(S): at 23:25

## 2025-03-19 RX ADMIN — Medication 1000 MILLIGRAM(S): at 09:54

## 2025-03-19 RX ADMIN — Medication 200 MILLIGRAM(S): at 01:03

## 2025-03-19 RX ADMIN — OXYCODONE HYDROCHLORIDE 5 MILLIGRAM(S): 30 TABLET ORAL at 01:41

## 2025-03-19 RX ADMIN — Medication 600 MILLIGRAM(S): at 11:16

## 2025-03-19 RX ADMIN — Medication 600 MILLIGRAM(S): at 06:11

## 2025-03-19 RX ADMIN — BUPROPION HYDROBROMIDE 300 MILLIGRAM(S): 522 TABLET, EXTENDED RELEASE ORAL at 11:17

## 2025-03-19 RX ADMIN — Medication 600 MILLIGRAM(S): at 00:30

## 2025-03-19 RX ADMIN — OXYCODONE HYDROCHLORIDE 5 MILLIGRAM(S): 30 TABLET ORAL at 10:32

## 2025-03-19 RX ADMIN — Medication 600 MILLIGRAM(S): at 18:47

## 2025-03-19 RX ADMIN — Medication 1000 MILLIGRAM(S): at 16:24

## 2025-03-19 RX ADMIN — Medication 1000 MILLIGRAM(S): at 08:41

## 2025-03-19 RX ADMIN — Medication 1000 MILLIGRAM(S): at 21:02

## 2025-03-19 RX ADMIN — Medication 600 MILLIGRAM(S): at 18:13

## 2025-03-19 RX ADMIN — OXYCODONE HYDROCHLORIDE 5 MILLIGRAM(S): 30 TABLET ORAL at 00:41

## 2025-03-19 NOTE — CHART NOTE - NSCHARTNOTESELECT_GEN_ALL_CORE
Event Note
Event Note
Post operative check
R1 - Critical H/H
R1 - Trial of Void
R2 - Post Op Check
Event Note
Event Note
R4 Hypotension Evaluation
Reassessment after 1upRBC/Event Note

## 2025-03-19 NOTE — PROGRESS NOTE ADULT - SUBJECTIVE AND OBJECTIVE BOX
[facility] PROGRESS NOTE ([id number])    INTERVAL EVENTS:  NAEO    SUBJECTIVE:  Patient seen and examined bedside    OBJECTIVE:  Vital Signs Last 24 Hrs  T(C): 36.6 (19 Mar 2025 00:20), Max: 36.6 (18 Mar 2025 12:09)  T(F): 97.9 (19 Mar 2025 00:20), Max: 97.9 (18 Mar 2025 12:09)  HR: 76 (19 Mar 2025 00:20) (70 - 78)  BP: 96/59 (19 Mar 2025 00:20) (91/53 - 111/77)  BP(mean): 76 (18 Mar 2025 20:00) (67 - 78)  RR: 18 (19 Mar 2025 00:20) (16 - 18)  SpO2: 97% (19 Mar 2025 00:20) (97% - 100%)    Parameters below as of 19 Mar 2025 00:20  Patient On (Oxygen Delivery Method): room air        Physical Examination:  GEN: NAD, resting quietly  NEURO: AAOx3, CN II-XII grossly intact, no focal deficits  PULM: symmetric chest rise bilaterally, no increased WOB  ABD: soft, mild incisional TTP, incisions cdi with overlying dermabond  EXTR: no lower extremity edema, moving all extremities

## 2025-03-19 NOTE — CHART NOTE - NSCHARTNOTEFT_GEN_A_CORE
Pt manoharal at bedside for trial of void.  Pain well controlled but patient is having some difficulty with walking due to pain in her hips, bilaterally.     Bladder fully drained through apple catheter.  Retrograde filled with 300cc sterile water then clamped.    Apple removed. Patient assisted to restroom.  Given necessary time to void (20 minutes).    Patient voided 250 of yellow urine, passing TOV.    To be discharged home without apple catheter.    dw Dr. Bojorquez, PGY-5, Urogynecology Fellow  Danita Chavarria, PGY-1

## 2025-03-19 NOTE — PROVIDER CONTACT NOTE (MEDICATION) - ASSESSMENT
Pt asymptomatic, denies headaches, dizziness, double or blurry vision. No bleeding noted.  alert/oriented x3

## 2025-03-19 NOTE — CHART NOTE - NSCHARTNOTEFT_GEN_A_CORE
Bedside nurse notified team of critical H/H value of 4.5/14. Patient seen by GYN, Urogyn and Gen Surg teams this AM, clinically well appearing. However, blood pressures low overnight.     Upon reviewing CBC results, values all appear to be dilutional. This was confirmed by beside nurse who confirmed that blood draw was taken from the same arm as the patient's fluids are running.     Will obtain STAT repeat labs (CBC/BMP) and continue to monitor.     [facility] Team  Danita Chavarria, PGY-1 Bedside nurse notified team of critical H/H value of 4.5/14. Patient seen by GYN, Urogyn and Gen Surg teams this AM, clinically well appearing. However, blood pressures low overnight.     Upon reviewing CBC results, values all appear to be dilutional. This was confirmed by bedside nurse who confirmed that blood draw was taken from the same arm as the patient's fluids are running.     Will obtain STAT repeat labs (CBC/BMP) and continue to monitor.     [facility] Team  Danita Chavarria, PGY-1

## 2025-03-19 NOTE — PROGRESS NOTE ADULT - SUBJECTIVE AND OBJECTIVE BOX
R2 GYN Progress Note    POD#1   HD#2    Patient seen and examined at bedside. Patient c/o suprapubic pressure overnight, apple repositioned draining 550cc of urine. Patient with bladder spasms after improving with pyridium. Patient is tolerating regular diet. Apple is still in place. Denies CP, SOB, N/V, fevers, and chills.    Vital Signs Last 24 Hours  T(C): 37.4 (03-19-25 @ 04:40), Max: 37.4 (03-19-25 @ 04:40)  HR: 65 (03-19-25 @ 05:39) (65 - 78)  BP: 78/46 (03-19-25 @ 05:39) (76/43 - 111/77)  RR: 18 (03-19-25 @ 04:40) (16 - 18)  SpO2: 97% (03-19-25 @ 04:40) (97% - 100%)    I&O's Summary    18 Mar 2025 07:01  -  19 Mar 2025 06:36  --------------------------------------------------------  IN: 725 mL / OUT: 1615 mL / NET: -890 mL        Physical Exam:  General: NAD  Lungs: breathing comfortably on room air  Abdomen: Soft, mildly tender, mildly distended, tympanic, normoactive bowel sounds  Incision: LSC incisions CDI  : no bleeding on pad. Vaginal packing and apple in place.   Ext: No pain or swelling in lower extremities bilaterally     Labs:      MEDICATIONS  (STANDING):  acetaminophen     Tablet .. 1000 milliGRAM(s) Oral every 6 hours  buPROPion XL (24-Hour) . 300 milliGRAM(s) Oral daily  cefoTEtan  IVPB 2 Gram(s) IV Intermittent once  escitalopram 20 milliGRAM(s) Oral daily  ibuprofen  Tablet. 600 milliGRAM(s) Oral every 6 hours  lactated ringers. 1000 milliLiter(s) (75 mL/Hr) IV Continuous <Continuous>    MEDICATIONS  (PRN):  oxyCODONE    IR 5 milliGRAM(s) Oral every 4 hours PRN Severe Pain (7 - 10)  phenazopyridine 200 milliGRAM(s) Oral three times a day PRN bladder spasm  senna 2 Tablet(s) Oral at bedtime PRN Constipation      A/P: 49y POD#   s/p _. Patient is stable and doing well.      Neuro: Pain well controlled. PO pain meds.   CV: Hemodynamically stable, AM labs include  Pulm: Saturating well on room air, encourage oob/amb, encourage use of incentive spirometry  GI: Advance to regular diet vs. Continue regular diet  : UOP adequate, d/c apple   vs. Voiding spontaneously  Heme: c/w HSQ, ambulation and SCDs for DVT ppx  FEN: LR@125.  replete electrolytes prn   ID: Afebrile  Endo: No active issues   Dispo: Continue routine post-op care    Krissy Dougherty PGY-2   R2 GYN Progress Note    POD#1   HD#2    Patient seen and examined at bedside. Patient c/o suprapubic pressure overnight, apple repositioned draining 550cc of urine. Patient with bladder spasms after improving with pyridium. Patient is tolerating regular diet. Apple is still in place. Denies CP, SOB, N/V, fevers, and chills.    Vital Signs Last 24 Hours  T(C): 37.4 (03-19-25 @ 04:40), Max: 37.4 (03-19-25 @ 04:40)  HR: 65 (03-19-25 @ 05:39) (65 - 78)  BP: 78/46 (03-19-25 @ 05:39) (76/43 - 111/77)  RR: 18 (03-19-25 @ 04:40) (16 - 18)  SpO2: 97% (03-19-25 @ 04:40) (97% - 100%)    I&O's Summary    18 Mar 2025 07:01  -  19 Mar 2025 06:36  --------------------------------------------------------  IN: 725 mL / OUT: 1615 mL / NET: -890 mL        Physical Exam:  General: NAD  Lungs: breathing comfortably on room air  Abdomen: Soft, mildly tender, mildly distended, tympanic, normoactive bowel sounds  Incision: LSC incisions CDI  : no bleeding on pad. Vaginal packing and apple in place.   Ext: No pain or swelling in lower extremities bilaterally     Labs:      MEDICATIONS  (STANDING):  acetaminophen     Tablet .. 1000 milliGRAM(s) Oral every 6 hours  buPROPion XL (24-Hour) . 300 milliGRAM(s) Oral daily  cefoTEtan  IVPB 2 Gram(s) IV Intermittent once  escitalopram 20 milliGRAM(s) Oral daily  ibuprofen  Tablet. 600 milliGRAM(s) Oral every 6 hours  lactated ringers. 1000 milliLiter(s) (75 mL/Hr) IV Continuous <Continuous>    MEDICATIONS  (PRN):  oxyCODONE    IR 5 milliGRAM(s) Oral every 4 hours PRN Severe Pain (7 - 10)  phenazopyridine 200 milliGRAM(s) Oral three times a day PRN bladder spasm  senna 2 Tablet(s) Oral at bedtime PRN Constipation     R2 GYN Progress Note    POD#1   HD#2    Patient seen and examined at bedside. Patient c/o suprapubic pressure overnight, apple repositioned draining 550cc of urine. Patient with bladder spasms after improving with pyridium. Patient hypotensive to 70s/40s at 5AM, denies lightheadedness, dizziness.  Patient is tolerating regular diet. Apple is still in place. Denies CP, SOB, N/V, fevers, and chills.    Vital Signs Last 24 Hours  T(C): 37.4 (03-19-25 @ 04:40), Max: 37.4 (03-19-25 @ 04:40)  HR: 65 (03-19-25 @ 05:39) (65 - 78)  BP: 78/46 (03-19-25 @ 05:39) (76/43 - 111/77)  RR: 18 (03-19-25 @ 04:40) (16 - 18)  SpO2: 97% (03-19-25 @ 04:40) (97% - 100%)    I&O's Summary    18 Mar 2025 07:01  -  19 Mar 2025 06:36  --------------------------------------------------------  IN: 725 mL / OUT: 1615 mL / NET: -890 mL        Physical Exam:  General: NAD  Lungs: breathing comfortably on room air  Abdomen: Soft, mildly tender, mildly distended, tympanic, normoactive bowel sounds  Incision: LSC incisions CDI  : no bleeding on pad. Vaginal packing and apple in place.   Ext: No pain or swelling in lower extremities bilaterally     Labs:      MEDICATIONS  (STANDING):  acetaminophen     Tablet .. 1000 milliGRAM(s) Oral every 6 hours  buPROPion XL (24-Hour) . 300 milliGRAM(s) Oral daily  cefoTEtan  IVPB 2 Gram(s) IV Intermittent once  escitalopram 20 milliGRAM(s) Oral daily  ibuprofen  Tablet. 600 milliGRAM(s) Oral every 6 hours  lactated ringers. 1000 milliLiter(s) (75 mL/Hr) IV Continuous <Continuous>    MEDICATIONS  (PRN):  oxyCODONE    IR 5 milliGRAM(s) Oral every 4 hours PRN Severe Pain (7 - 10)  phenazopyridine 200 milliGRAM(s) Oral three times a day PRN bladder spasm  senna 2 Tablet(s) Oral at bedtime PRN Constipation

## 2025-03-19 NOTE — PROVIDER CONTACT NOTE (MEDICATION) - BACKGROUND
pt s/p supracervical hysterectomy with sacrocolpopexy , BL salpingectomy, mid urithral sling with cystoscopy

## 2025-03-19 NOTE — CHART NOTE - NSCHARTNOTEFT_GEN_A_CORE
Patient seen and examined by me. She looks and feels well. Abdomen soft and nondistended. h/h stable. She passed voiding trial. Will plan to continue with observation overnight. If vitals remain stable and exam normal, will proceed with discharge in AM. All questions answered.

## 2025-03-19 NOTE — CHART NOTE - NSCHARTNOTEFT_GEN_A_CORE
Patient seen at bedside. She was noted to be hypotensive this morning with hct 14, however her blood was drawn above level of running IV. Blood was redrawn with h/h of 8.6/25.8. Patient feels well and is well appearing. Abdomen soft, nondistended and appropriately tender around umbilical incision site. Vaginal packing removed, minimal staining on packing and no active vaginal bleeding. Her BP repeated and found to be 90s/50s. I recommend transfusion of 1-2 units, CT Angio. Will keep patient NPO. At this time I have low concern for active bleeding, however will continue to observe closely.

## 2025-03-19 NOTE — CHART NOTE - NSCHARTNOTEFT_GEN_A_CORE
R2 GYN Progress Note    POD#1   HD#2    Patient seen and examined at bedside. Patient ambulating, with no lightheadedness/dizziness. Reports moderate diffuse abdominal pain that changes location and incisional pain. Ko is still in place.  Denies CP, SOB, N/V, fevers, and chills.    Vital Signs Last 24 Hours  T(C): 37.5 (03-19-25 @ 11:34), Max: 37.5 (03-19-25 @ 11:34)  HR: 64 (03-19-25 @ 11:34) (64 - 78)  BP: 93/57 (03-19-25 @ 11:34) (76/43 - 111/77)  RR: 18 (03-19-25 @ 11:34) (16 - 18)  SpO2: 98% (03-19-25 @ 11:34) (97% - 100%)    I&O's Summary    18 Mar 2025 07:01  -  19 Mar 2025 07:00  --------------------------------------------------------  IN: 725 mL / OUT: 1615 mL / NET: -890 mL    19 Mar 2025 07:01  -  19 Mar 2025 12:56  --------------------------------------------------------  IN: 1650 mL / OUT: 1350 mL / NET: 300 mL        Physical Exam:  General: NAD  Lungs: breathing comfortably on room air   Abdomen: Soft, mildly tender, mildly distended especially around umbillical incision, tympanic, normoactive bowel sounds  Incision: LSC incisions and umbilical incision CDI  : no bleeding on pad. Ko in place draining clear urine.  Ext: No pain or swelling in lower extremities bilaterally     Labs:                        9.8    7.50  )-----------( 205      ( 19 Mar 2025 12:10 )             29.0   baso x      eos x      imm gran x      lymph x      mono x      poly x                            8.6    8.66  )-----------( 215      ( 19 Mar 2025 07:03 )             25.8   baso x      eos x      imm gran x      lymph x      mono x      poly x                            4.5    5.01  )-----------( 124      ( 19 Mar 2025 06:27 )             14.0   baso x      eos x      imm gran 0.6    lymph x      mono x      poly x          MEDICATIONS  (STANDING):  acetaminophen     Tablet .. 1000 milliGRAM(s) Oral every 6 hours  buPROPion XL (24-Hour) . 300 milliGRAM(s) Oral daily  cefoTEtan  IVPB 2 Gram(s) IV Intermittent once  escitalopram 20 milliGRAM(s) Oral daily  ibuprofen  Tablet. 600 milliGRAM(s) Oral every 6 hours  lactated ringers. 1000 milliLiter(s) (75 mL/Hr) IV Continuous <Continuous>    MEDICATIONS  (PRN):  oxyCODONE    IR 5 milliGRAM(s) Oral every 4 hours PRN Severe Pain (7 - 10)  phenazopyridine 200 milliGRAM(s) Oral three times a day PRN bladder spasm  senna 2 Tablet(s) Oral at bedtime PRN Constipation  simethicone 80 milliGRAM(s) Chew four times a day PRN Gas      A/P: 50yo s/p RA-IRLANDA, SCP, BS, Posterior repair, cysto, umbilical hernia repair recovering well in acute post-operative state. Patient with hypotension at 5AM 70s/40s given 1L bolus and 1upRBC. H/H uptrended appropriately. BP now 90s/50s with reassuring clinical exam without any evidence of active bleeding. Resume regular diet and trial of void this afternoon.     Neuro: Pain well controlled. PO pain meds.   CV: Hemodynamically stable. H/H 8.6/25.8->1upRBC->9.8/29.0.   Pulm: Saturating well on room air, encourage oob/amb, encourage use of incentive spirometry  GI: Regular diet.   : TOV today.   Heme: Ambulation and SCDs for DVT ppx  FEN: LR@75  ID: Afebrile  Endo: No active issues   Dispo: TOV. Continue routine post-op care.     Krissy Dougherty PGY-2

## 2025-03-19 NOTE — CHART NOTE - NSCHARTNOTEFT_GEN_A_CORE
Briefly this is a 49year old POD#1 s/p RA-IRLANDA, SCP, Post Repair, Cysto, Umbilical Hernia Repair. Overnight patient noted to have multiple hypotensive events with systolics in the 70s. Of note the patient's baseline blood pressure is 90-100s systolic. I evaluated the patient this AM due to hypotension.    On my evaluation, patient denied any lightheadedness or dizziness although she did state that she had not gotten out of bed since the surgery. Endorsed some abdominal pain although pain over well controlled. Denied chest pain, shortness of breath    On my exam, heart rate was in the 60-70s. UOP overnight adequate for her current weight. Physical exam revealed an appropriately tender abdomen. At time of assessment patient's AM labs came back that revealed an H/H 4.5/14. That being said, MD made aware by RN that labs were drawn on the same arm as the IV.    While intra-abdominal bleed falls relatively high on the differential of any post operative patient with a drop in Hct and hypotension, given patient's otherwise stable vital signs, benign abdominal exam, adequate urine output, and the fact that the labs were drawn erroneously,  lower concern for active intraabdominal bleeding at this time. Will continue to monitor patient very closely    Plan  -STAT repeat of labs  -Strict I/O  -Close vital signs monitoring    discussed w Dr Reno Arriaza PGY4

## 2025-03-19 NOTE — PROGRESS NOTE ADULT - ASSESSMENT
50yo s/p RA-IRLANDA, SCP, Posterior repair, cysto, umbilical hernia repair recovering well in acute post-operative state. Urinary retention overnight resolving with repositioning of apple.    Neuro/Psych: PO pain meds      - Cont. home Lexapro and Wellbutrin for hx of anxiety  CV:  F/u AM CBC.   Pulm: Ecourage oob/ambulation, incentive spirometer at bedside  GI: Regular Diet       - Senna PRN  : Apple in place with vaginal packing      - Apple and Vaginal packing to be removed in AM  Heme: SCDs for DVT PPX  ID: afebrile  Endo: no active issues  Dispo: Continue routine post-op care    Krissy Dougherty, PGY2    48yo s/p RA-IRLANDA, SCP, Posterior repair, cysto, umbilical hernia repair recovering well in acute post-operative state. Urinary retention overnight resolving with repositioning of apple. Patient with hypotension at 5AM 70s/40s given 500cc bolus, otherwise asymptomatic. Follow up CBC.     Neuro/Psych: PO pain meds      - Cont. home Lexapro and Wellbutrin for hx of anxiety  CV: Monitor VS. F/u AM CBC.   Pulm: Encourage oob/ambulation, incentive spirometer at bedside  GI: Regular Diet       - Senna PRN  : Apple in place with vaginal packing      - Apple and Vaginal packing to be removed in AM  Heme: SCDs for DVT PPX  ID: afebrile  Endo: no active issues  Dispo: F/u AM CBC. Continue routine post-op care    Krissy Dougherty, PGY2    48yo s/p RA-IRLANDA, SCP, BS, Posterior repair, cysto, umbilical hernia repair recovering well in acute post-operative state. Patient with hypotension at 5AM 70s/40s given 1L bolus, repeat CBC stable    Neuro/Psych: PO pain meds      - Cont. home Lexapro and Wellbutrin for hx of anxiety  CV: Monitor VS. F/u AM CBC.   Pulm: Encourage oob/ambulation, incentive spirometer at bedside  GI: Regular Diet       - Senna PRN  : Apple in place with vaginal packing      - Apple and Vaginal packing to be removed in AM  Heme: SCDs for DVT PPX  ID: afebrile  Endo: no active issues  Dispo: F/u AM CBC. Continue routine post-op care    Krissy Dougherty, PGY2     _____________________________________  Pt resting comfortably in bed on arrival to the room. She appears well with good color. She reports her pain is well controlled. She denies and dizziness, lightheadedness. She has not yet attempted to get out of bed. Apple is draining well now. She has not yet attempted to eat anything but denies any nausea/vomiting.     Vital Signs Last 24 Hrs  T(C): 37.4 (19 Mar 2025 04:40), Max: 37.4 (19 Mar 2025 04:40)  T(F): 99.3 (19 Mar 2025 04:40), Max: 99.3 (19 Mar 2025 04:40)  HR: 78 (19 Mar 2025 08:00) (65 - 78)  BP: 92/55 (19 Mar 2025 08:00) (76/43 - 111/77)  BP(mean): 76 (18 Mar 2025 20:00) (67 - 78)  RR: 18 (19 Mar 2025 04:40) (16 - 18)  SpO2: 97% (19 Mar 2025 04:40) (97% - 100%)    Parameters below as of 19 Mar 2025 04:40  Patient On (Oxygen Delivery Method): room air    Physical Exam  Gen: NAD  HEENT: NCAT, sclera anicteric  Resp: non-labored  Abd: soft, non-distended, non-tender; incisions clean, dry, intact  : apple draining clear urine; pad with <5 cc blood, vaginal packing removed and 15% saturated without any active bleeding noted               CBC Full  -  ( 19 Mar 2025 07:03 )  WBC Count : 8.66 K/uL  RBC Count : 2.85 M/uL  Hemoglobin : 8.6 g/dL  Hematocrit : 25.8 %  Platelet Count - Automated : 215 K/uL  Mean Cell Volume : 90.5 fl  Mean Cell Hemoglobin : 30.2 pg  Mean Cell Hemoglobin Concentration : 33.3 g/dL    A/P: 48yo s/p RA-IRLANDA, SCP, BS, Posterior repair, cysto, umbilical hernia repair recovering well in acute post-operative state. Patient with hypotension at 5AM 70s/40s given 1L bolus now 90s/50s with reassuring clinical exam without any evidence of active bleeding.     - Initial CBC likely erroneous as collected from above IV line, repeat with appropriate Hct 25.8, however pt with BP 90s/70s. Given hypotension will order 2u pRBC, one to be given now.  - No evidence of bleeding on exam. Pt with allergy to IV contrast. Discussed with IR however no other way to image for active bleed without IV contrast. Will plan for 1u pRBC to be given and will repeat CBC to ensure appropriate rise upon completion of 1st unit.  - Continue IV fluids  - plan for NPO currently, cantransition to house diet this afe  -mate; TOV later today pending above plan  - Continue pain regimen  - DVT ppx  - OOB and IS    Dispo: Continue inpatient management with blood transfusion. If hemodynamically stable can dc later today pending repeat labs    Kimberlee Bojorquez, PGY-5  Urogynecology Fellow 50yo s/p RA-IRLANDA, SCP, BS, Posterior repair, cysto, umbilical hernia repair recovering well in acute post-operative state. Patient with hypotension at 5AM 70s/40s given 1L bolus, repeat CBC stable    Neuro/Psych: PO pain meds      - Cont. home Lexapro and Wellbutrin for hx of anxiety  CV: Monitor VS. F/u AM CBC.   Pulm: Encourage oob/ambulation, incentive spirometer at bedside  GI: Regular Diet       - Senna PRN  : Apple in place with vaginal packing      - Apple and Vaginal packing to be removed in AM  Heme: SCDs for DVT PPX  ID: afebrile  Endo: no active issues  Dispo: F/u AM CBC. Continue routine post-op care    Krissy Dougherty, PGY2     _____________________________________  Pt resting comfortably in bed on arrival to the room. She appears well with good color. She reports her pain is well controlled. She denies and dizziness, lightheadedness. She has not yet attempted to get out of bed. Apple is draining well now. She has not yet attempted to eat anything but denies any nausea/vomiting.     Vital Signs Last 24 Hrs  T(C): 37.4 (19 Mar 2025 04:40), Max: 37.4 (19 Mar 2025 04:40)  T(F): 99.3 (19 Mar 2025 04:40), Max: 99.3 (19 Mar 2025 04:40)  HR: 78 (19 Mar 2025 08:00) (65 - 78)  BP: 92/55 (19 Mar 2025 08:00) (76/43 - 111/77)  BP(mean): 76 (18 Mar 2025 20:00) (67 - 78)  RR: 18 (19 Mar 2025 04:40) (16 - 18)  SpO2: 97% (19 Mar 2025 04:40) (97% - 100%)    Parameters below as of 19 Mar 2025 04:40  Patient On (Oxygen Delivery Method): room air    Physical Exam  Gen: NAD  HEENT: NCAT, sclera anicteric  Resp: non-labored  Abd: soft, non-distended, non-tender; incisions clean, dry, intact  : apple draining clear urine; pad with <5 cc blood, vaginal packing removed and 15% saturated without any active bleeding noted               CBC Full  -  ( 19 Mar 2025 07:03 )  WBC Count : 8.66 K/uL  RBC Count : 2.85 M/uL  Hemoglobin : 8.6 g/dL  Hematocrit : 25.8 %  Platelet Count - Automated : 215 K/uL  Mean Cell Volume : 90.5 fl  Mean Cell Hemoglobin : 30.2 pg  Mean Cell Hemoglobin Concentration : 33.3 g/dL    A/P: 50yo s/p RA-IRLANDA, SCP, BS, Posterior repair, cysto, umbilical hernia repair recovering well in acute post-operative state. Patient with hypotension at 5AM 70s/40s given 1L bolus now 90s/50s with reassuring clinical exam without any evidence of active bleeding.     - Initial CBC from this AM likely erroneous as collected from above IV line, immediate repeat with appropriate Hct 25.8, however pt with BP 90s/70s. Given hypotension will order 2u pRBC, one to be given now.  - No evidence of bleeding on exam. Pt with allergy to IV contrast. Discussed with IR however no other way to image for active bleed without IV contrast. Patient is stable at this time. Will plan for 1u pRBC to be given and will repeat CBC to ensure appropriate rise upon completion of 1st unit.  - Continue IV fluids  - plan for NPO currently, can transition to house diet this afternoon if pt stable  - UOP adequate; TOV later today pending above plan  - Continue pain regimen  - DVT ppx  - OOB and IS    Dispo: Continue inpatient management with blood transfusion. If hemodynamically stable can consider dc later today pending repeat labs    Kimberlee Bojorquez, PGY-5  Urogynecology Fellow

## 2025-03-19 NOTE — CHART NOTE - NSCHARTNOTEFT_GEN_A_CORE
Brief Urogyn Chart Note    Patient seen and examined.  Pain controlled.  Patient is well appearing.  VSS. Abd soft, non-distended, appropriately tender over incision sites. Patient received 1u pRBC with improvement in Hct from 25.8 to 29.0.  Reviewed with patient that she responded appropriately to transfusion and that vital signs were improved.  No concern for active bleeding at this time.  Will continue to monitor closely.    Ashley Davis MD  Urogynecology Fellow, PGY-8

## 2025-03-19 NOTE — PROVIDER CONTACT NOTE (CRITICAL VALUE NOTIFICATION) - ASSESSMENT
Pt asymtomatic, No dizziness, blurry/ double vision, no headaches, skin tone pink. Alert/oriented x4. No bleeding noted, apple output wdl

## 2025-03-19 NOTE — CHART NOTE - NSCHARTNOTEFT_GEN_A_CORE
Urogyn Chart Update    Patient able to get oob/ambulate.  Also tolerating PO intake without n/v. Will repeat CBC at 4 pm; if stable, will perform TOV with possible discharge tonight vs. tomorrow morning.  Plan d/w Dr. Senior.    Ashley Davis MD  Urogynecology Fellow, PGY-8

## 2025-03-19 NOTE — PROGRESS NOTE ADULT - ASSESSMENT
The patient is a 49y Female with hx of obesity, anxiety, depression, uterine fibroids, ovarian cyst, HMB s/p D&C x2, uterovaginal prolpase and mixed urinary incontinence who is now s/p umbilical hernia repair in addition RA hysterectomy and salpingectomy with gyn team     Plan:  - regular diet  - multimodal pain control  - global care per gyn    [facility]   [id number].   The patient is a 49y Female with hx of obesity, anxiety, depression, uterine fibroids, ovarian cyst, HMB s/p D&C x2, uterovaginal prolpase and mixed urinary incontinence who is now s/p umbilical hernia repair in addition RA hysterectomy and salpingectomy with gyn team. Pt found to be hypotensive this morning with systolic of 76. AM labs showed Hgb of 8.6. Primary team to obtain CTA and transfuse.     Plan:  - regular diet when okay with primary team  - multimodal pain control  - global care per gyn    [facility]   [id number].

## 2025-03-20 ENCOUNTER — TRANSCRIPTION ENCOUNTER (OUTPATIENT)
Age: 50
End: 2025-03-20

## 2025-03-20 VITALS
TEMPERATURE: 98 F | SYSTOLIC BLOOD PRESSURE: 102 MMHG | DIASTOLIC BLOOD PRESSURE: 67 MMHG | HEART RATE: 71 BPM | RESPIRATION RATE: 18 BRPM | OXYGEN SATURATION: 98 %

## 2025-03-20 PROCEDURE — 36430 TRANSFUSION BLD/BLD COMPNT: CPT

## 2025-03-20 PROCEDURE — 86923 COMPATIBILITY TEST ELECTRIC: CPT

## 2025-03-20 PROCEDURE — 80048 BASIC METABOLIC PNL TOTAL CA: CPT

## 2025-03-20 PROCEDURE — C1781: CPT

## 2025-03-20 PROCEDURE — 85025 COMPLETE CBC W/AUTO DIFF WBC: CPT

## 2025-03-20 PROCEDURE — 88305 TISSUE EXAM BY PATHOLOGIST: CPT

## 2025-03-20 PROCEDURE — 86850 RBC ANTIBODY SCREEN: CPT

## 2025-03-20 PROCEDURE — C1889: CPT

## 2025-03-20 PROCEDURE — 82962 GLUCOSE BLOOD TEST: CPT

## 2025-03-20 PROCEDURE — C9399: CPT

## 2025-03-20 PROCEDURE — C1771: CPT

## 2025-03-20 PROCEDURE — 86901 BLOOD TYPING SEROLOGIC RH(D): CPT

## 2025-03-20 PROCEDURE — S2900: CPT

## 2025-03-20 PROCEDURE — P9016: CPT

## 2025-03-20 PROCEDURE — 85027 COMPLETE CBC AUTOMATED: CPT

## 2025-03-20 PROCEDURE — 86900 BLOOD TYPING SEROLOGIC ABO: CPT

## 2025-03-20 RX ORDER — DIAZEPAM 2 MG/1
1 TABLET ORAL
Qty: 5 | Refills: 0
Start: 2025-03-20 | End: 2025-03-24

## 2025-03-20 RX ADMIN — Medication 1000 MILLIGRAM(S): at 03:40

## 2025-03-20 RX ADMIN — Medication 1000 MILLIGRAM(S): at 03:10

## 2025-03-20 RX ADMIN — Medication 2 TABLET(S): at 06:10

## 2025-03-20 RX ADMIN — Medication 1000 MILLIGRAM(S): at 09:35

## 2025-03-20 RX ADMIN — Medication 600 MILLIGRAM(S): at 06:09

## 2025-03-20 RX ADMIN — Medication 1000 MILLIGRAM(S): at 10:15

## 2025-03-20 NOTE — DISCHARGE NOTE NURSING/CASE MANAGEMENT/SOCIAL WORK - NSDCPNINST_GEN_ALL_CORE
Notify md for fever, increased vaginal bleeding, increased pain, nausea, vomitting, difficulty urinating, shortness of breath, leg pain, chest pain or any other concerns

## 2025-03-20 NOTE — PROGRESS NOTE ADULT - ASSESSMENT
A/P: 48yo POD#2 s/p RA-IRLANDA, SCP, BS, Posterior repair, cysto, umbilical hernia repair recovering well in acute post-operative state. Patient with hypotension yesterday (3/19) 70s/40s given 1L bolus and 1upRBC. H/H uptrended appropriately. BP now 90s/50s with reassuring clinical exam without any evidence of active bleeding. Passed TOV yesterday.     Neuro: Pain well controlled. PO pain meds.   CV: Hemodynamically stable. H/H 8.6/25.8->1upRBC->9.8/29.0. F/u AM H/H.   Pulm: Saturating well on room air, encourage oob/amb, encourage use of incentive spirometry  GI: Regular diet.   : Voiding spontaneously   Heme: Ambulation and SCDs for DVT ppx  FEN: LR@75  ID: Afebrile  Endo: No active issues   Dispo:  Continue routine post-op care.     Krissy Dougherty PGY-2. A/P: 50yo POD#2 s/p RA-IRLANDA, SCP, BS, Posterior repair, cysto, umbilical hernia repair recovering well in acute post-operative state. Patient with hypotension yesterday (3/19) 70s/40s given 1L bolus and 1upRBC. H/H uptrended appropriately and remains stable.. BP now 90s/50s with reassuring clinical exam without any evidence of active bleeding. Passed TOV yesterday.     Neuro: Pain well controlled. PO pain meds.   CV: Hemodynamically stable. H/H 8.6/25.8->1upRBC->9.8/29.0->9.7/28.8. F/u AM H/H.   Pulm: Saturating well on room air, encourage oob/amb, encourage use of incentive spirometry  GI: Regular diet.   : Voiding spontaneously   Heme: Ambulation and SCDs for DVT ppx  FEN: LR@75  ID: Afebrile  Endo: No active issues   Dispo:  Continue routine post-op care.     Krissy Dougherty PGY-2. A/P: 48yo POD#2 s/p RA-IRLANDA, SCP, BS, Posterior repair, cysto, umbilical hernia repair recovering well in acute post-operative state. Patient with hypotension yesterday (3/19) 70s/40s given 1L bolus and 1upRBC. H/H uptrended appropriately and remains stable.. BP now 90s/50s with reassuring clinical exam without any evidence of active bleeding. Passed TOV yesterday.     Neuro: Pain well controlled. PO pain meds.   CV: Hemodynamically stable. H/H 8.6/25.8->1upRBC->9.8/29.0->9.7/28.8. F/u AM H/H.   Pulm: Saturating well on room air, encourage oob/amb, encourage use of incentive spirometry  GI: Regular diet.   : Voiding spontaneously   Heme: Ambulation and SCDs for DVT ppx  FEN: LR@75  ID: Afebrile  Endo: No active issues   Dispo:  Continue routine post-op care.     Krissy Dougherty PGY-2.    _________________________________________________________________________  Pt is resting comfortably in bed this AM. Reports got a good nights sleep. Reports pain well controlled. Denies n/v. Reports urinating a normal amount. Eating and passing flatus.     Vital Signs Last 24 Hrs  T(C): 36.9 (20 Mar 2025 05:17), Max: 37.5 (19 Mar 2025 11:34)  T(F): 98.4 (20 Mar 2025 05:17), Max: 99.5 (19 Mar 2025 11:34)  HR: 71 (20 Mar 2025 05:17) (64 - 78)  BP: 92/57 (20 Mar 2025 05:17) (90/53 - 98/62)  BP(mean): --  RR: 18 (20 Mar 2025 05:17) (18 - 18)  SpO2: 97% (20 Mar 2025 05:17) (96% - 98%)    Parameters below as of 20 Mar 2025 05:17  Patient On (Oxygen Delivery Method): room air    Physical Exam  Gen: NAD  HEENT: NCAT, sclera anicteric  Resp: non-labored  Abd: soft, non-distended, non-tender; incisions clean, dry, intact  : apple draining clear urine; pad with <5 cc blood                          9.7    7.05  )-----------( 201      ( 19 Mar 2025 16:08 )             28.8     A/P: 48yo POD#2 s/p RA-IRLANDA, SCP, BS, Posterior repair, cysto, umbilical hernia repair recovering well in acute post-operative state. Patient with hypotension yesterday and erroneous cbc with repeat showing Hgb 8.6. s/p 1upRBC. H/H uptrended appropriately and remains stable, stable for dc home    - House Diet  - s/p TOV yesterday  - Continue pain regimen  - DVT ppx  - OOB and IS    Dispo: Home today    Kimberlee Bojorquez, PGY-5  Urogynecology Fellow   A/P: 48yo POD#2 s/p RA-IRLANDA, SCP, BS, Posterior repair, cysto, umbilical hernia repair recovering well in acute post-operative state. Patient with hypotension yesterday (3/19) 70s/40s given 1L bolus and 1upRBC. H/H uptrended appropriately and remains stable.. BP now 90s/50s with reassuring clinical exam without any evidence of active bleeding. Passed TOV yesterday.     Neuro: Pain well controlled. PO pain meds.   CV: Hemodynamically stable. H/H 8.6/25.8->1upRBC->9.8/29.0->9.7/28.8. F/u AM H/H.   Pulm: Saturating well on room air, encourage oob/amb, encourage use of incentive spirometry  GI: Regular diet.   : Voiding spontaneously   Heme: Ambulation and SCDs for DVT ppx  FEN: LR@75  ID: Afebrile  Endo: No active issues   Dispo:  Continue routine post-op care.     Krissy Dougherty PGY-2.    _________________________________________________________________________  Pt is resting comfortably in bed this AM. Reports got a good nights sleep. Reports pain well controlled. Denies n/v. Reports urinating a normal amount. Eating and passing flatus.     Vital Signs Last 24 Hrs  T(C): 36.9 (20 Mar 2025 05:17), Max: 37.5 (19 Mar 2025 11:34)  T(F): 98.4 (20 Mar 2025 05:17), Max: 99.5 (19 Mar 2025 11:34)  HR: 71 (20 Mar 2025 05:17) (64 - 78)  BP: 92/57 (20 Mar 2025 05:17) (90/53 - 98/62)  BP(mean): --  RR: 18 (20 Mar 2025 05:17) (18 - 18)  SpO2: 97% (20 Mar 2025 05:17) (96% - 98%)    Parameters below as of 20 Mar 2025 05:17  Patient On (Oxygen Delivery Method): room air    Physical Exam  Gen: NAD  HEENT: NCAT, sclera anicteric  Resp: non-labored  Abd: soft, non-distended, non-tender; incisions clean, dry, intact  : pad with <5 cc blood                          9.7    7.05  )-----------( 201      ( 19 Mar 2025 16:08 )             28.8     A/P: 48yo POD#2 s/p RA-IRLANDA, SCP, BS, Posterior repair, cysto, umbilical hernia repair recovering well in acute post-operative state. Patient with hypotension yesterday and erroneous cbc with repeat showing Hgb 8.6. s/p 1upRBC. H/H uptrended appropriately and remains stable, stable for dc home    - House Diet  - s/p TOV yesterday  - Continue pain regimen  - DVT ppx  - OOB and IS    Dispo: Home today    Kimberlee Bojorquez, PGY-5  Urogynecology Fellow   A/P: 50yo POD#2 s/p RA-IRLANDA, SCP, BS, Posterior repair, cysto, umbilical hernia repair recovering well in acute post-operative state. Patient with hypotension yesterday (3/19) 70s/40s given 1L bolus and 1upRBC. H/H uptrended appropriately and remains stable.. BP now 90s/50s with reassuring clinical exam without any evidence of active bleeding. Passed TOV yesterday.     Neuro: Pain well controlled. PO pain meds.   CV: Hemodynamically stable. H/H 8.6/25.8->1upRBC->9.8/29.0->9.7/28.8. F/u AM H/H.   Pulm: Saturating well on room air, encourage oob/amb, encourage use of incentive spirometry  GI: Regular diet.   : Voiding spontaneously   Heme: Ambulation and SCDs for DVT ppx  FEN: LR@75  ID: Afebrile  Endo: No active issues   Dispo:  Continue routine post-op care.     Krissy Dougherty PGY-2.    _________________________________________________________________________  Pt is resting comfortably in bed this AM. Reports got a good nights sleep. Reports pain well controlled. Denies n/v. Reports urinating a normal amount. Eating and passing flatus.     Vital Signs Last 24 Hrs  T(C): 36.9 (20 Mar 2025 05:17), Max: 37.5 (19 Mar 2025 11:34)  T(F): 98.4 (20 Mar 2025 05:17), Max: 99.5 (19 Mar 2025 11:34)  HR: 71 (20 Mar 2025 05:17) (64 - 78)  BP: 92/57 (20 Mar 2025 05:17) (90/53 - 98/62)  BP(mean): --  RR: 18 (20 Mar 2025 05:17) (18 - 18)  SpO2: 97% (20 Mar 2025 05:17) (96% - 98%)    Parameters below as of 20 Mar 2025 05:17  Patient On (Oxygen Delivery Method): room air    Physical Exam  Gen: NAD  HEENT: NCAT, sclera anicteric  Resp: non-labored  Abd: soft, non-distended, non-tender; incisions clean, dry, intact  : pad with <5 cc blood                          9.7    7.05  )-----------( 201      ( 19 Mar 2025 16:08 )             28.8     A/P: 50yo POD#2 s/p RA-IRLANDA, SCP, BS, Posterior repair, cysto, umbilical hernia repair recovering well in acute post-operative state. Patient with hypotension yesterday and erroneous cbc with repeat showing Hgb 8.6. s/p 1upRBC. H/H uptrended appropriately and remains stable, stable for dc home    - House Diet  - s/p TOV yesterday  - Continue pain regimen  - DVT ppx  - OOB and IS    Dispo: Home today    Kimberlee Bojorquez, PGY-5  Urogynecology Fellow    Attending note: agree with fellow note. Patient feels well, exam normal. Abdomen soft, nondistended. Passing flatus and ambulating well. Will proceed with discharge planning. Postop restrictions and precautions reviewed.

## 2025-03-20 NOTE — PROGRESS NOTE ADULT - ASSESSMENT
The patient is a 49y Female with hx of obesity, anxiety, depression, uterine fibroids, ovarian cyst, HMB s/p D&C x2, uterovaginal prolpase and mixed urinary incontinence who is now s/p umbilical hernia repair in addition RA hysterectomy and salpingectomy with gyn team. Pt found to be hypotensive this morning with systolic of 76. AM labs showed Hgb of 8.6. Primary team to obtain CTA and transfuse.     Plan:  - regular diet when okay with primary team  - multimodal pain control  - global care per gyn    [facility]   [id number].   The patient is a 49y Female with hx of obesity, anxiety, depression, uterine fibroids, ovarian cyst, HMB s/p D&C x2, uterovaginal prolpase and mixed urinary incontinence who is now s/p umbilical hernia repair in addition RA hysterectomy and salpingectomy with gyn team. Pt found to be hypotensive this morning with systolic of 76. AM labs showed Hgb of 8.6.    Plan:  - regular diet when okay with primary team  - multimodal pain control  - global care per gyn    [facility]   [id number].

## 2025-03-20 NOTE — DISCHARGE NOTE NURSING/CASE MANAGEMENT/SOCIAL WORK - FINANCIAL ASSISTANCE
[facility] provides services at a reduced cost to those who are determined to be eligible through [facility]’s financial assistance program. Information regarding [facility]’s financial assistance program can be found by going to [email or web address] or by calling [phone].

## 2025-03-20 NOTE — PROGRESS NOTE ADULT - SUBJECTIVE AND OBJECTIVE BOX
[facility] PROGRESS NOTE ([id number])    INTERVAL EVENTS:  - patient hypotensive yday AM, given 1L bolus and 1 u pRBC with appropriate response   - passed TOV    SUBJECTIVE:  Patient seen and examined bedside    OBJECTIVE:  Vital Signs Last 24 Hrs  T(C): 37.1 (20 Mar 2025 00:36), Max: 37.5 (19 Mar 2025 11:34)  T(F): 98.7 (20 Mar 2025 00:36), Max: 99.5 (19 Mar 2025 11:34)  HR: 70 (20 Mar 2025 00:36) (64 - 78)  BP: 95/58 (20 Mar 2025 00:36) (76/43 - 98/62)  BP(mean): --  RR: 18 (20 Mar 2025 00:36) (18 - 18)  SpO2: 97% (20 Mar 2025 00:36) (96% - 98%)    Parameters below as of 20 Mar 2025 00:36  Patient On (Oxygen Delivery Method): room air        Physical Examination:  GEN: NAD, resting quietly  NEURO: AAOx3, CN II-XII grossly intact, no focal deficits  PULM: symmetric chest rise bilaterally, no increased WOB  ABD: soft, mild incisional TTP, incisions cdi with overlying dermabond  EXTR: no lower extremity edema, moving all extremities

## 2025-03-20 NOTE — DISCHARGE NOTE NURSING/CASE MANAGEMENT/SOCIAL WORK - PATIENT PORTAL LINK FT
You can access the FollowMyHealth Patient Portal offered by [facility] by registering at the following website: [email or web address]. By joining [facility]’s FollowMyHealth portal, you will also be able to view your health information using other applications (apps) compatible with our system.

## 2025-03-20 NOTE — PROGRESS NOTE ADULT - SUBJECTIVE AND OBJECTIVE BOX
R2 GYN Progress Note    POD#2   HD#3    Patient seen and examined at bedside.  No acute events overnight. No acute complaints.  Pain well controlled. Patient is ambulating and tolerating regular diet. Patient is passing flatus.  Patient is voiding spontaneously. Denies CP, SOB, N/V, fevers, and chills.    Vital Signs Last 24 Hours  T(C): 36.9 (03-20-25 @ 05:17), Max: 37.5 (03-19-25 @ 11:34)  HR: 71 (03-20-25 @ 05:17) (64 - 78)  BP: 92/57 (03-20-25 @ 05:17) (90/53 - 98/62)  RR: 18 (03-20-25 @ 05:17) (18 - 18)  SpO2: 97% (03-20-25 @ 05:17) (96% - 98%)    I&O's Summary    18 Mar 2025 07:01  -  19 Mar 2025 07:00  --------------------------------------------------------  IN: 725 mL / OUT: 1615 mL / NET: -890 mL    19 Mar 2025 07:01  -  20 Mar 2025 06:27  --------------------------------------------------------  IN: 1650 mL / OUT: 1650 mL / NET: 0 mL        Physical Exam:  General: NAD  Lungs: breathing comfortably on room air  Abdomen: Soft, mildly tender, mildly distended, tympanic, normoactive bowel sounds  Incision: LSC incisions CDI  : no bleeding on pad  Ext: No pain or swelling in lower extremities bilaterally     Labs:                        9.7    7.05  )-----------( 201      ( 19 Mar 2025 16:08 )             28.8   baso x      eos x      imm gran x      lymph x      mono x      poly x                            9.8    7.50  )-----------( 205      ( 19 Mar 2025 12:10 )             29.0   baso x      eos x      imm gran x      lymph x      mono x      poly x                            8.6    8.66  )-----------( 215      ( 19 Mar 2025 07:03 )             25.8   baso x      eos x      imm gran x      lymph x      mono x      poly x                            4.5    5.01  )-----------( 124      ( 19 Mar 2025 06:27 )             14.0   baso x      eos x      imm gran 0.6    lymph x      mono x      poly x          MEDICATIONS  (STANDING):  acetaminophen     Tablet .. 1000 milliGRAM(s) Oral every 6 hours  buPROPion XL (24-Hour) . 300 milliGRAM(s) Oral daily  cefoTEtan  IVPB 2 Gram(s) IV Intermittent once  escitalopram 20 milliGRAM(s) Oral daily  ibuprofen  Tablet. 600 milliGRAM(s) Oral every 6 hours  sodium chloride 0.9% lock flush 3 milliLiter(s) IV Push every 8 hours    MEDICATIONS  (PRN):  oxyCODONE    IR 5 milliGRAM(s) Oral every 4 hours PRN Severe Pain (7 - 10)  phenazopyridine 200 milliGRAM(s) Oral three times a day PRN bladder spasm  senna 2 Tablet(s) Oral at bedtime PRN Constipation  simethicone 80 milliGRAM(s) Chew four times a day PRN Gas

## 2025-03-24 LAB — SURGICAL PATHOLOGY STUDY: SIGNIFICANT CHANGE UP

## 2025-04-07 ENCOUNTER — APPOINTMENT (OUTPATIENT)
Dept: SURGERY | Facility: CLINIC | Age: 50
End: 2025-04-07
Payer: COMMERCIAL

## 2025-04-07 VITALS
OXYGEN SATURATION: 99 % | TEMPERATURE: 96.3 F | HEART RATE: 69 BPM | RESPIRATION RATE: 16 BRPM | BODY MASS INDEX: 18.64 KG/M2 | SYSTOLIC BLOOD PRESSURE: 102 MMHG | DIASTOLIC BLOOD PRESSURE: 69 MMHG | WEIGHT: 116 LBS | HEIGHT: 66 IN

## 2025-04-07 DIAGNOSIS — Z09 ENCOUNTER FOR FOLLOW-UP EXAMINATION AFTER COMPLETED TREATMENT FOR CONDITIONS OTHER THAN MALIGNANT NEOPLASM: ICD-10-CM

## 2025-04-07 PROBLEM — N39.46 MIXED INCONTINENCE: Chronic | Status: ACTIVE | Noted: 2025-02-28

## 2025-04-07 PROCEDURE — 99213 OFFICE O/P EST LOW 20 MIN: CPT

## 2025-04-09 ENCOUNTER — APPOINTMENT (OUTPATIENT)
Dept: UROGYNECOLOGY | Facility: CLINIC | Age: 50
End: 2025-04-09

## 2025-04-09 DIAGNOSIS — Z98.890 OTHER SPECIFIED POSTPROCEDURAL STATES: ICD-10-CM

## 2025-04-09 DIAGNOSIS — N89.8 OTHER SPECIFIED NONINFLAMMATORY DISORDERS OF VAGINA: ICD-10-CM

## 2025-04-09 PROCEDURE — 99024 POSTOP FOLLOW-UP VISIT: CPT

## 2025-04-11 DIAGNOSIS — B96.89 ACUTE VAGINITIS: ICD-10-CM

## 2025-04-11 DIAGNOSIS — N76.0 ACUTE VAGINITIS: ICD-10-CM

## 2025-04-11 LAB
CANDIDA VAG CYTO: NOT DETECTED
G VAGINALIS+PREV SP MTYP VAG QL MICRO: DETECTED
T VAGINALIS VAG QL WET PREP: NOT DETECTED

## 2025-04-11 RX ORDER — METRONIDAZOLE 500 MG/1
500 TABLET ORAL TWICE DAILY
Qty: 14 | Refills: 0 | Status: ACTIVE | COMMUNITY
Start: 2025-04-11 | End: 1900-01-01

## 2025-04-16 ENCOUNTER — TRANSCRIPTION ENCOUNTER (OUTPATIENT)
Age: 50
End: 2025-04-16

## 2025-04-28 ENCOUNTER — APPOINTMENT (OUTPATIENT)
Dept: SURGERY | Facility: CLINIC | Age: 50
End: 2025-04-28
Payer: COMMERCIAL

## 2025-04-28 ENCOUNTER — TRANSCRIPTION ENCOUNTER (OUTPATIENT)
Age: 50
End: 2025-04-28

## 2025-04-28 VITALS
WEIGHT: 116 LBS | BODY MASS INDEX: 18.64 KG/M2 | HEART RATE: 76 BPM | DIASTOLIC BLOOD PRESSURE: 64 MMHG | HEIGHT: 66 IN | OXYGEN SATURATION: 99 % | RESPIRATION RATE: 16 BRPM | TEMPERATURE: 97.4 F | SYSTOLIC BLOOD PRESSURE: 95 MMHG

## 2025-04-28 DIAGNOSIS — K42.9 UMBILICAL HERNIA W/OUT OBSTRUCTION OR GANGRENE: ICD-10-CM

## 2025-04-28 PROCEDURE — 99213 OFFICE O/P EST LOW 20 MIN: CPT

## 2025-05-06 ENCOUNTER — APPOINTMENT (OUTPATIENT)
Dept: UROGYNECOLOGY | Facility: CLINIC | Age: 50
End: 2025-05-06
Payer: COMMERCIAL

## 2025-05-06 VITALS
HEART RATE: 69 BPM | WEIGHT: 116 LBS | DIASTOLIC BLOOD PRESSURE: 69 MMHG | BODY MASS INDEX: 18.64 KG/M2 | SYSTOLIC BLOOD PRESSURE: 107 MMHG | HEIGHT: 66 IN

## 2025-05-06 DIAGNOSIS — Z09 ENCOUNTER FOR FOLLOW-UP EXAMINATION AFTER COMPLETED TREATMENT FOR CONDITIONS OTHER THAN MALIGNANT NEOPLASM: ICD-10-CM

## 2025-05-06 DIAGNOSIS — R10.2 PELVIC AND PERINEAL PAIN: ICD-10-CM

## 2025-05-06 PROCEDURE — 99024 POSTOP FOLLOW-UP VISIT: CPT

## 2025-05-09 ENCOUNTER — APPOINTMENT (OUTPATIENT)
Dept: SURGERY | Facility: CLINIC | Age: 50
End: 2025-05-09
